# Patient Record
Sex: FEMALE | Race: BLACK OR AFRICAN AMERICAN | NOT HISPANIC OR LATINO | Employment: FULL TIME | URBAN - METROPOLITAN AREA
[De-identification: names, ages, dates, MRNs, and addresses within clinical notes are randomized per-mention and may not be internally consistent; named-entity substitution may affect disease eponyms.]

---

## 2018-01-22 LAB — CRC RECOMMENDATION EXT: NORMAL

## 2020-09-24 ENCOUNTER — HISTORICAL (OUTPATIENT)
Dept: ADMINISTRATIVE | Facility: HOSPITAL | Age: 55
End: 2020-09-24

## 2020-09-24 LAB
ABS NEUT (OLG): 4.59 X10(3)/MCL (ref 2.1–9.2)
ALBUMIN SERPL-MCNC: 3.4 GM/DL (ref 3.4–5)
ALBUMIN/GLOB SERPL: 0.9 RATIO (ref 1.1–2)
ALP SERPL-CCNC: 65 UNIT/L (ref 45–117)
ALT SERPL-CCNC: 16 UNIT/L (ref 12–78)
AST SERPL-CCNC: 16 UNIT/L (ref 15–37)
BASOPHILS # BLD AUTO: 0 X10(3)/MCL (ref 0–0.2)
BASOPHILS NFR BLD AUTO: 0 %
BILIRUB SERPL-MCNC: 0.3 MG/DL (ref 0.2–1)
BILIRUBIN DIRECT+TOT PNL SERPL-MCNC: <0.1 MG/DL (ref 0–0.2)
BILIRUBIN DIRECT+TOT PNL SERPL-MCNC: ABNORMAL MG/DL
BUN SERPL-MCNC: 14 MG/DL (ref 7–18)
CALCIUM SERPL-MCNC: 9 MG/DL (ref 8.5–10.1)
CHLORIDE SERPL-SCNC: 108 MMOL/L (ref 98–107)
CO2 SERPL-SCNC: 26 MMOL/L (ref 21–32)
CREAT SERPL-MCNC: 0.7 MG/DL (ref 0.6–1.3)
EOSINOPHIL # BLD AUTO: 0.2 X10(3)/MCL (ref 0–0.9)
EOSINOPHIL NFR BLD AUTO: 3 %
ERYTHROCYTE [DISTWIDTH] IN BLOOD BY AUTOMATED COUNT: 14.4 % (ref 11.5–14.5)
GLOBULIN SER-MCNC: 3.6 GM/ML (ref 2.3–3.5)
GLUCOSE SERPL-MCNC: 97 MG/DL (ref 74–106)
HCT VFR BLD AUTO: 36.9 % (ref 35–46)
HGB BLD-MCNC: 12.2 GM/DL (ref 12–16)
IMM GRANULOCYTES # BLD AUTO: 0.01 10*3/UL
IMM GRANULOCYTES NFR BLD AUTO: 0 %
LYMPHOCYTES # BLD AUTO: 0.9 X10(3)/MCL (ref 0.6–4.6)
LYMPHOCYTES NFR BLD AUTO: 14 %
MCH RBC QN AUTO: 29.9 PG (ref 26–34)
MCHC RBC AUTO-ENTMCNC: 33.1 GM/DL (ref 31–37)
MCV RBC AUTO: 90.4 FL (ref 80–100)
MONOCYTES # BLD AUTO: 0.4 X10(3)/MCL (ref 0.1–1.3)
MONOCYTES NFR BLD AUTO: 7 %
NEUTROPHILS # BLD AUTO: 4.59 X10(3)/MCL (ref 2.1–9.2)
NEUTROPHILS NFR BLD AUTO: 75 %
PLATELET # BLD AUTO: 281 X10(3)/MCL (ref 130–400)
PMV BLD AUTO: 10.5 FL (ref 7.4–10.4)
POTASSIUM SERPL-SCNC: 4.4 MMOL/L (ref 3.5–5.1)
PROT SERPL-MCNC: 7 GM/DL (ref 6.4–8.2)
RBC # BLD AUTO: 4.08 X10(6)/MCL (ref 4–5.2)
SODIUM SERPL-SCNC: 142 MMOL/L (ref 136–145)
TSH SERPL-ACNC: 1.35 MIU/L (ref 0.36–3.74)
WBC # SPEC AUTO: 6.1 X10(3)/MCL (ref 4.5–11)

## 2020-12-09 ENCOUNTER — HISTORICAL (OUTPATIENT)
Dept: OTOLARYNGOLOGY | Facility: CLINIC | Age: 55
End: 2020-12-09

## 2020-12-09 LAB
ABS NEUT (OLG): 2.35 X10(3)/MCL (ref 2.1–9.2)
ALBUMIN SERPL-MCNC: 3.4 GM/DL (ref 3.5–5)
ALBUMIN/GLOB SERPL: 1.2 RATIO (ref 1.1–2)
ALP SERPL-CCNC: 57 UNIT/L (ref 40–150)
ALT SERPL-CCNC: 9 UNIT/L (ref 0–55)
AST SERPL-CCNC: 14 UNIT/L (ref 5–34)
BASOPHILS # BLD AUTO: 0 X10(3)/MCL (ref 0–0.2)
BASOPHILS NFR BLD AUTO: 0 %
BILIRUB SERPL-MCNC: 0.6 MG/DL
BILIRUBIN DIRECT+TOT PNL SERPL-MCNC: 0.3 MG/DL (ref 0–0.5)
BILIRUBIN DIRECT+TOT PNL SERPL-MCNC: 0.3 MG/DL (ref 0–0.8)
BUN SERPL-MCNC: 11 MG/DL (ref 9.8–20.1)
CALCIUM SERPL-MCNC: 7.1 MG/DL (ref 8.4–10.2)
CHLORIDE SERPL-SCNC: 108 MMOL/L (ref 98–107)
CO2 SERPL-SCNC: 28 MMOL/L (ref 22–29)
CREAT SERPL-MCNC: 0.82 MG/DL (ref 0.55–1.02)
DEPRECATED CALCIDIOL+CALCIFEROL SERPL-MC: 33.5 NG/ML (ref 30–80)
EOSINOPHIL # BLD AUTO: 0.2 X10(3)/MCL (ref 0–0.9)
EOSINOPHIL NFR BLD AUTO: 4 %
ERYTHROCYTE [DISTWIDTH] IN BLOOD BY AUTOMATED COUNT: 13.2 % (ref 11.5–14.5)
GLOBULIN SER-MCNC: 2.8 GM/DL (ref 2.4–3.5)
GLUCOSE SERPL-MCNC: 98 MG/DL (ref 74–100)
HCT VFR BLD AUTO: 34.3 % (ref 35–46)
HGB BLD-MCNC: 11.1 GM/DL (ref 12–16)
IMM GRANULOCYTES # BLD AUTO: 0.01 10*3/UL
IMM GRANULOCYTES NFR BLD AUTO: 0 %
LYMPHOCYTES # BLD AUTO: 0.9 X10(3)/MCL (ref 0.6–4.6)
LYMPHOCYTES NFR BLD AUTO: 23 %
MCH RBC QN AUTO: 29.1 PG (ref 26–34)
MCHC RBC AUTO-ENTMCNC: 32.4 GM/DL (ref 31–37)
MCV RBC AUTO: 90 FL (ref 80–100)
MONOCYTES # BLD AUTO: 0.4 X10(3)/MCL (ref 0.1–1.3)
MONOCYTES NFR BLD AUTO: 10 %
NEUTROPHILS # BLD AUTO: 2.35 X10(3)/MCL (ref 2.1–9.2)
NEUTROPHILS NFR BLD AUTO: 62 %
PLATELET # BLD AUTO: 230 X10(3)/MCL (ref 130–400)
PMV BLD AUTO: 10.5 FL (ref 7.4–10.4)
POTASSIUM SERPL-SCNC: 4.1 MMOL/L (ref 3.5–5.1)
PROT SERPL-MCNC: 6.2 GM/DL (ref 6.4–8.3)
RBC # BLD AUTO: 3.81 X10(6)/MCL (ref 4–5.2)
SODIUM SERPL-SCNC: 141 MMOL/L (ref 136–145)
TSH SERPL-ACNC: 2.01 UIU/ML (ref 0.35–4.94)
WBC # SPEC AUTO: 3.8 X10(3)/MCL (ref 4.5–11)

## 2021-01-06 ENCOUNTER — HISTORICAL (OUTPATIENT)
Dept: RADIOLOGY | Facility: HOSPITAL | Age: 56
End: 2021-01-06

## 2021-04-19 ENCOUNTER — HISTORICAL (OUTPATIENT)
Dept: HEMATOLOGY/ONCOLOGY | Facility: CLINIC | Age: 56
End: 2021-04-19

## 2021-04-19 LAB
ABS NEUT (OLG): 1.98 X10(3)/MCL (ref 2.1–9.2)
ALBUMIN SERPL-MCNC: 3.5 GM/DL (ref 3.5–5)
ALBUMIN/GLOB SERPL: 1.1 RATIO (ref 1.1–2)
ALP SERPL-CCNC: 68 UNIT/L (ref 40–150)
ALT SERPL-CCNC: 13 UNIT/L (ref 0–55)
AST SERPL-CCNC: 17 UNIT/L (ref 5–34)
BASOPHILS # BLD AUTO: 0 X10(3)/MCL (ref 0–0.2)
BASOPHILS NFR BLD AUTO: 1 %
BILIRUB SERPL-MCNC: 0.7 MG/DL
BILIRUBIN DIRECT+TOT PNL SERPL-MCNC: 0.3 MG/DL (ref 0–0.5)
BILIRUBIN DIRECT+TOT PNL SERPL-MCNC: 0.4 MG/DL (ref 0–0.8)
BUN SERPL-MCNC: 11.9 MG/DL (ref 9.8–20.1)
CALCIUM SERPL-MCNC: 9.1 MG/DL (ref 8.4–10.2)
CHLORIDE SERPL-SCNC: 106 MMOL/L (ref 98–107)
CO2 SERPL-SCNC: 30 MMOL/L (ref 22–29)
CREAT SERPL-MCNC: 0.69 MG/DL (ref 0.55–1.02)
EOSINOPHIL # BLD AUTO: 0.2 X10(3)/MCL (ref 0–0.9)
EOSINOPHIL NFR BLD AUTO: 5 %
ERYTHROCYTE [DISTWIDTH] IN BLOOD BY AUTOMATED COUNT: 13.4 % (ref 11.5–14.5)
GLOBULIN SER-MCNC: 3.2 GM/DL (ref 2.4–3.5)
GLUCOSE SERPL-MCNC: 101 MG/DL (ref 74–100)
HCT VFR BLD AUTO: 36.3 % (ref 35–46)
HGB BLD-MCNC: 11.8 GM/DL (ref 12–16)
IMM GRANULOCYTES # BLD AUTO: 0.01 10*3/UL
IMM GRANULOCYTES NFR BLD AUTO: 0 %
LYMPHOCYTES # BLD AUTO: 1 X10(3)/MCL (ref 0.6–4.6)
LYMPHOCYTES NFR BLD AUTO: 28 %
MCH RBC QN AUTO: 29.2 PG (ref 26–34)
MCHC RBC AUTO-ENTMCNC: 32.5 GM/DL (ref 31–37)
MCV RBC AUTO: 89.9 FL (ref 80–100)
MONOCYTES # BLD AUTO: 0.3 X10(3)/MCL (ref 0.1–1.3)
MONOCYTES NFR BLD AUTO: 9 %
NEUTROPHILS # BLD AUTO: 1.98 X10(3)/MCL (ref 2.1–9.2)
NEUTROPHILS NFR BLD AUTO: 57 %
PLATELET # BLD AUTO: 235 X10(3)/MCL (ref 130–400)
PMV BLD AUTO: 10.1 FL (ref 7.4–10.4)
POTASSIUM SERPL-SCNC: 4.1 MMOL/L (ref 3.5–5.1)
PROT SERPL-MCNC: 6.7 GM/DL (ref 6.4–8.3)
RBC # BLD AUTO: 4.04 X10(6)/MCL (ref 4–5.2)
SODIUM SERPL-SCNC: 140 MMOL/L (ref 136–145)
TSH SERPL-ACNC: 2.27 UIU/ML (ref 0.35–4.94)
WBC # SPEC AUTO: 3.5 X10(3)/MCL (ref 4.5–11)

## 2021-10-19 ENCOUNTER — HISTORICAL (OUTPATIENT)
Dept: HEMATOLOGY/ONCOLOGY | Facility: CLINIC | Age: 56
End: 2021-10-19

## 2021-10-19 LAB
ABS NEUT (OLG): 3.42 X10(3)/MCL (ref 2.1–9.2)
ALBUMIN SERPL-MCNC: 3.8 GM/DL (ref 3.5–5)
ALBUMIN/GLOB SERPL: 1.2 RATIO (ref 1.1–2)
ALP SERPL-CCNC: 63 UNIT/L (ref 40–150)
ALT SERPL-CCNC: 11 UNIT/L (ref 0–55)
AST SERPL-CCNC: 17 UNIT/L (ref 5–34)
BASOPHILS # BLD AUTO: 0 X10(3)/MCL (ref 0–0.2)
BASOPHILS NFR BLD AUTO: 0 %
BILIRUB SERPL-MCNC: 0.7 MG/DL
BILIRUBIN DIRECT+TOT PNL SERPL-MCNC: 0.2 MG/DL (ref 0–0.5)
BILIRUBIN DIRECT+TOT PNL SERPL-MCNC: 0.5 MG/DL (ref 0–0.8)
BUN SERPL-MCNC: 7.7 MG/DL (ref 9.8–20.1)
CALCIUM SERPL-MCNC: 9.5 MG/DL (ref 8.4–10.2)
CHLORIDE SERPL-SCNC: 108 MMOL/L (ref 98–107)
CO2 SERPL-SCNC: 27 MMOL/L (ref 22–29)
CREAT SERPL-MCNC: 0.81 MG/DL (ref 0.55–1.02)
EOSINOPHIL # BLD AUTO: 0.2 X10(3)/MCL (ref 0–0.9)
EOSINOPHIL NFR BLD AUTO: 4 %
ERYTHROCYTE [DISTWIDTH] IN BLOOD BY AUTOMATED COUNT: 13.7 % (ref 11.5–14.5)
GLOBULIN SER-MCNC: 3.2 GM/DL (ref 2.4–3.5)
GLUCOSE SERPL-MCNC: 109 MG/DL (ref 74–100)
HCT VFR BLD AUTO: 37.9 % (ref 35–46)
HGB BLD-MCNC: 12.8 GM/DL (ref 12–16)
IMM GRANULOCYTES # BLD AUTO: 0.01 10*3/UL
IMM GRANULOCYTES NFR BLD AUTO: 0 %
LYMPHOCYTES # BLD AUTO: 0.9 X10(3)/MCL (ref 0.6–4.6)
LYMPHOCYTES NFR BLD AUTO: 18 %
MCH RBC QN AUTO: 30.4 PG (ref 26–34)
MCHC RBC AUTO-ENTMCNC: 33.8 GM/DL (ref 31–37)
MCV RBC AUTO: 90 FL (ref 80–100)
MONOCYTES # BLD AUTO: 0.4 X10(3)/MCL (ref 0.1–1.3)
MONOCYTES NFR BLD AUTO: 8 %
NEUTROPHILS # BLD AUTO: 3.42 X10(3)/MCL (ref 2.1–9.2)
NEUTROPHILS NFR BLD AUTO: 70 %
NRBC BLD AUTO-RTO: 0 % (ref 0–0.2)
PLATELET # BLD AUTO: 253 X10(3)/MCL (ref 130–400)
PMV BLD AUTO: 9.9 FL (ref 7.4–10.4)
POTASSIUM SERPL-SCNC: 4.1 MMOL/L (ref 3.5–5.1)
PROT SERPL-MCNC: 7 GM/DL (ref 6.4–8.3)
RBC # BLD AUTO: 4.21 X10(6)/MCL (ref 4–5.2)
SODIUM SERPL-SCNC: 143 MMOL/L (ref 136–145)
TSH SERPL-ACNC: 2.08 UIU/ML (ref 0.35–4.94)
WBC # SPEC AUTO: 4.9 X10(3)/MCL (ref 4.5–11)

## 2021-12-03 ENCOUNTER — HISTORICAL (OUTPATIENT)
Dept: RADIOLOGY | Facility: HOSPITAL | Age: 56
End: 2021-12-03

## 2021-12-03 LAB — CREAT SERPL-MCNC: 0.77 MG/DL (ref 0.55–1.02)

## 2022-04-11 ENCOUNTER — HISTORICAL (OUTPATIENT)
Dept: ADMINISTRATIVE | Facility: HOSPITAL | Age: 57
End: 2022-04-11
Payer: COMMERCIAL

## 2022-04-19 ENCOUNTER — HISTORICAL (OUTPATIENT)
Dept: ADMINISTRATIVE | Facility: HOSPITAL | Age: 57
End: 2022-04-19
Payer: COMMERCIAL

## 2022-04-19 LAB
ABS NEUT (OLG): 1.71 (ref 2.1–9.2)
ALBUMIN SERPL-MCNC: 3.4 G/DL (ref 3.5–5)
ALBUMIN/GLOB SERPL: 1.1 {RATIO} (ref 1.1–2)
ALP SERPL-CCNC: 55 U/L (ref 40–150)
ALT SERPL-CCNC: 6 U/L (ref 0–55)
AST SERPL-CCNC: 15 U/L (ref 5–34)
BASOPHILS # BLD AUTO: 0 10*3/UL (ref 0–0.2)
BASOPHILS NFR BLD AUTO: 1 %
BILIRUB SERPL-MCNC: 0.5 MG/DL
BILIRUBIN DIRECT+TOT PNL SERPL-MCNC: 0.2 (ref 0–0.5)
BILIRUBIN DIRECT+TOT PNL SERPL-MCNC: 0.3 (ref 0–0.8)
BUN SERPL-MCNC: 12.7 MG/DL (ref 9.8–20.1)
CALCIUM SERPL-MCNC: 9 MG/DL (ref 8.7–10.5)
CHLORIDE SERPL-SCNC: 108 MMOL/L (ref 98–107)
CO2 SERPL-SCNC: 29 MMOL/L (ref 22–29)
CREAT SERPL-MCNC: 0.71 MG/DL (ref 0.55–1.02)
EOSINOPHIL # BLD AUTO: 0.2 10*3/UL (ref 0–0.9)
EOSINOPHIL NFR BLD AUTO: 6 %
ERYTHROCYTE [DISTWIDTH] IN BLOOD BY AUTOMATED COUNT: 13.2 % (ref 11.5–14.5)
FLAG2 (OHS): 70
FLAG3 (OHS): 80
FLAGS (OHS): 80
GLOBULIN SER-MCNC: 3.1 G/DL (ref 2.4–3.5)
GLUCOSE SERPL-MCNC: 96 MG/DL (ref 74–100)
HCT VFR BLD AUTO: 36.5 % (ref 35–46)
HEMOLYSIS INTERF INDEX SERPL-ACNC: 5
HGB BLD-MCNC: 12 G/DL (ref 12–16)
ICTERIC INTERF INDEX SERPL-ACNC: 1
LIPEMIC INTERF INDEX SERPL-ACNC: 6
LOW EVENT # SUSPECT FLAG (OHS): 80
LYMPHOCYTES # BLD AUTO: 1.1 10*3/UL (ref 0.6–4.6)
LYMPHOCYTES NFR BLD AUTO: 32 %
MANUAL DIFF? (OHS): NO
MCH RBC QN AUTO: 29.6 PG (ref 26–34)
MCHC RBC AUTO-ENTMCNC: 32.9 G/DL (ref 31–37)
MCV RBC AUTO: 90.1 FL (ref 80–100)
MO BLASTS SUSPECT FLAG (OHS): 40
MONOCYTES # BLD AUTO: 0.3 10*3/UL (ref 0.1–1.3)
MONOCYTES NFR BLD AUTO: 9 %
NEUTROPHILS # BLD AUTO: 1.71 10*3/UL (ref 2.1–9.2)
NEUTROPHILS NFR BLD AUTO: 52 %
NRBC BLD AUTO-RTO: 0 % (ref 0–0.2)
PLATELET # BLD AUTO: 255 10*3/UL (ref 130–400)
PLATELET CLUMPS SUSPECT FLAG (OHS): 20
PMV BLD AUTO: 10.4 FL (ref 7.4–10.4)
POTASSIUM SERPL-SCNC: 4.2 MMOL/L (ref 3.5–5.1)
PROT SERPL-MCNC: 6.5 G/DL (ref 6.4–8.3)
RBC # BLD AUTO: 4.05 10*6/UL (ref 4–5.2)
SODIUM SERPL-SCNC: 140 MMOL/L (ref 136–145)
TSH SERPL-ACNC: 0.7 M[IU]/L (ref 0.35–4.94)
WBC # SPEC AUTO: 3.3 10*3/UL (ref 4.5–11)

## 2022-04-28 VITALS
HEIGHT: 66 IN | DIASTOLIC BLOOD PRESSURE: 79 MMHG | SYSTOLIC BLOOD PRESSURE: 129 MMHG | WEIGHT: 183 LBS | BODY MASS INDEX: 29.41 KG/M2 | OXYGEN SATURATION: 100 %

## 2022-07-26 ENCOUNTER — OFFICE VISIT (OUTPATIENT)
Dept: OTOLARYNGOLOGY | Facility: CLINIC | Age: 57
End: 2022-07-26
Payer: COMMERCIAL

## 2022-07-26 VITALS
HEART RATE: 76 BPM | TEMPERATURE: 98 F | DIASTOLIC BLOOD PRESSURE: 77 MMHG | BODY MASS INDEX: 31.62 KG/M2 | WEIGHT: 194.38 LBS | SYSTOLIC BLOOD PRESSURE: 115 MMHG

## 2022-07-26 DIAGNOSIS — K11.7 XEROSTOMIA: ICD-10-CM

## 2022-07-26 DIAGNOSIS — J31.0 RHINITIS, CHRONIC: ICD-10-CM

## 2022-07-26 DIAGNOSIS — Z85.818 HISTORY OF CANCER TONSIL: Primary | ICD-10-CM

## 2022-07-26 PROCEDURE — 3078F DIAST BP <80 MM HG: CPT | Mod: CPTII,,, | Performed by: NURSE PRACTITIONER

## 2022-07-26 PROCEDURE — 99213 OFFICE O/P EST LOW 20 MIN: CPT | Mod: PBBFAC,25 | Performed by: NURSE PRACTITIONER

## 2022-07-26 PROCEDURE — 31575 PR LARYNGOSCOPY, FLEXIBLE; DIAGNOSTIC: ICD-10-PCS | Mod: S$PBB,,, | Performed by: NURSE PRACTITIONER

## 2022-07-26 PROCEDURE — 99214 PR OFFICE/OUTPT VISIT, EST, LEVL IV, 30-39 MIN: ICD-10-PCS | Mod: 25,S$PBB,, | Performed by: NURSE PRACTITIONER

## 2022-07-26 PROCEDURE — 3008F BODY MASS INDEX DOCD: CPT | Mod: CPTII,,, | Performed by: NURSE PRACTITIONER

## 2022-07-26 PROCEDURE — 3074F PR MOST RECENT SYSTOLIC BLOOD PRESSURE < 130 MM HG: ICD-10-PCS | Mod: CPTII,,, | Performed by: NURSE PRACTITIONER

## 2022-07-26 PROCEDURE — 99214 OFFICE O/P EST MOD 30 MIN: CPT | Mod: 25,S$PBB,, | Performed by: NURSE PRACTITIONER

## 2022-07-26 PROCEDURE — 3074F SYST BP LT 130 MM HG: CPT | Mod: CPTII,,, | Performed by: NURSE PRACTITIONER

## 2022-07-26 PROCEDURE — 1159F MED LIST DOCD IN RCRD: CPT | Mod: CPTII,,, | Performed by: NURSE PRACTITIONER

## 2022-07-26 PROCEDURE — 31575 DIAGNOSTIC LARYNGOSCOPY: CPT | Mod: PBBFAC | Performed by: NURSE PRACTITIONER

## 2022-07-26 PROCEDURE — 1159F PR MEDICATION LIST DOCUMENTED IN MEDICAL RECORD: ICD-10-PCS | Mod: CPTII,,, | Performed by: NURSE PRACTITIONER

## 2022-07-26 PROCEDURE — 3078F PR MOST RECENT DIASTOLIC BLOOD PRESSURE < 80 MM HG: ICD-10-PCS | Mod: CPTII,,, | Performed by: NURSE PRACTITIONER

## 2022-07-26 PROCEDURE — 3008F PR BODY MASS INDEX (BMI) DOCUMENTED: ICD-10-PCS | Mod: CPTII,,, | Performed by: NURSE PRACTITIONER

## 2022-07-26 PROCEDURE — 31575 DIAGNOSTIC LARYNGOSCOPY: CPT | Mod: S$PBB,,, | Performed by: NURSE PRACTITIONER

## 2022-07-26 RX ORDER — FLUTICASONE PROPIONATE 50 MCG
SPRAY, SUSPENSION (ML) NASAL
COMMUNITY
Start: 2022-04-06 | End: 2022-07-26 | Stop reason: SDUPTHER

## 2022-07-26 RX ORDER — PILOCARPINE HYDROCHLORIDE 5 MG/1
5 TABLET, FILM COATED ORAL 3 TIMES DAILY
COMMUNITY
Start: 2022-04-06 | End: 2022-07-26 | Stop reason: SDUPTHER

## 2022-07-26 RX ORDER — PILOCARPINE HYDROCHLORIDE 5 MG/1
5 TABLET, FILM COATED ORAL 3 TIMES DAILY
Qty: 90 TABLET | Refills: 11 | Status: SHIPPED | OUTPATIENT
Start: 2022-07-26 | End: 2022-07-26

## 2022-07-26 RX ORDER — ZINC SULFATE 50(220)MG
220 CAPSULE ORAL
COMMUNITY

## 2022-07-26 RX ORDER — KETOCONAZOLE 20 MG/ML
SHAMPOO, SUSPENSION TOPICAL
COMMUNITY

## 2022-07-26 RX ORDER — TRAMADOL HYDROCHLORIDE 50 MG/1
TABLET ORAL
COMMUNITY

## 2022-07-26 RX ORDER — LIDOCAINE HYDROCHLORIDE 40 MG/ML
1 INJECTION, SOLUTION RETROBULBAR ONCE
Status: DISCONTINUED | OUTPATIENT
Start: 2022-07-26 | End: 2022-12-08

## 2022-07-26 RX ORDER — PILOCARPINE HYDROCHLORIDE 5 MG/1
5 TABLET, FILM COATED ORAL 3 TIMES DAILY
Qty: 90 TABLET | Refills: 11 | Status: SHIPPED | OUTPATIENT
Start: 2022-07-26 | End: 2022-12-08 | Stop reason: SDUPTHER

## 2022-07-26 RX ORDER — FLUTICASONE PROPIONATE 50 MCG
2 SPRAY, SUSPENSION (ML) NASAL 2 TIMES DAILY
Qty: 16 G | Refills: 11 | Status: SHIPPED | OUTPATIENT
Start: 2022-07-26 | End: 2022-07-26

## 2022-07-26 RX ORDER — FLUTICASONE PROPIONATE 50 MCG
2 SPRAY, SUSPENSION (ML) NASAL 2 TIMES DAILY
Qty: 16 G | Refills: 11 | Status: SHIPPED | OUTPATIENT
Start: 2022-07-26 | End: 2022-08-11 | Stop reason: SDUPTHER

## 2022-07-26 RX ORDER — CLOTRIMAZOLE AND BETAMETHASONE DIPROPIONATE 10; .64 MG/G; MG/G
CREAM TOPICAL
COMMUNITY

## 2022-07-26 NOTE — PROGRESS NOTES
"[facility]  Otolaryngology Clinic Note    Jil Duque  Encounter Date: 7/26/2022  YOB: 1965    Chief Complaint: cancer surveillance    HPI: 12/8/20: Is a 55-year-old female who was seen by me in 2019 and private practice, a biopsy of the right tonsil was done and returned squamous cell carcinoma, HPV positive. She elects to go to [facility] where she received radiation therapy, had immunotherapy with proton beam radiation, which she completed in November 2019. She had post treatment scans including a PET scan and all were normal by her history. No notes from [facility] are available at this time, I reviewed the notes in the chart She was staged as a T3 N1 M0, She currently is experiencing some dysphagia, but this is gradually improving. She has lost a total of 90 pounds but is slowly gaining back. Her last CT scan was 8 months ago. She had been seen Dr. Oneal Bowden in [place], the notes in the chart reflect he did a fiberoptic endoscopy and was normal 3 months ago. She has since lost her job because of COVID and is now on Medicaid and was referred here for follow-up.    1/26/21: Returns to clinic today. No complaints or issues. No otalgia, weight loss, new dysphagia or odynophagia, changes in voice or breathing. Has a hard time eating meat due to longstanding dysphagia to it, but still able to get it down - feels it sometimes "gets stuck" but this is not new. Recent CT scans demonstrated no evidence of disease.    4/20/21: Doing well. Denies dysphagia, dysphonia, pain, wt loss, LAD. C/o recent nasal congestion and PND. Reports using flonase in the past which was helpful. Occasional nasal dryness. 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 Patient has no complaints of any sore throat, hemoptysis, or swelling in the neck. She does have periodic difficulty swallowing foods such " "as meats but otherwise has no dysphagia. She does admit to having problems with dry mouth since undergoing her treatment. She has been using Biotene but despite this she still has problems with dry mouth. This is worse at night.  The patient's oral intake does continue to improve. She had indicated that she does continue to gain some weight. She had had problems with altered sense of taste since her treatments but this is also improving though it is not back to its "normal" condition.  She does have some problems with chronic rhinitis and is currently using fluticasone nasal spray but does so on an every other day basis. She finds that it does help some. She has noticed that sometimes congestion may be worse at night and she is not sure if this may be related to her dryness of the mouth being worse at night. It also been given samples of a saline irrigation kit and she did find that that was also helpful with her nasal symptoms but she had not bought any refills.    9/21/21: Doing well overall. States she is having more uppercase PET scan\been eating more for pleasure and gaining weight. Denied any fatigue weakness myalgia. 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 States that she was doing better with her mucus and congestion when she was doing the dryness. Dr. Mcarthur gave her a last visit but has been out. Otherwise no other complaints    12/3/21: 12/8/20: Is a 55-year-old female who was seen by me in 2019 and private practice, a biopsy of the right tonsil was done and returned squamous cell carcinoma, HPV positive. She elects to go to [facility] where she received radiation therapy, had immunotherapy with proton beam radiation, which she completed in November 2019. She had post treatment scans including a PET scan and all were normal by her history. No notes from [facility] are available at this time, I reviewed the notes in the chart She was staged " "as a T3 N1 M0, She currently is experiencing some dysphagia, but this is gradually improving. She has lost a total of 90 pounds but is slowly gaining back. Her last CT scan was 8 months ago. She had been seen Dr. Oneal Bowden in [place], the notes in the chart reflect he did a fiberoptic endoscopy and was normal 3 months ago. She has since lost her job because of COVID and is now on Medicaid and was referred here for follow-up.    1/26/21: Returns to clinic today. No complaints or issues. No otalgia, weight loss, new dysphagia or odynophagia, changes in voice or breathing. Has a hard time eating meat due to longstanding dysphagia to it, but still able to get it down - feels it sometimes "gets stuck" but this is not new. Recent CT scans demonstrated no evidence of disease.    4/20/21: Doing well. Denies dysphagia, dysphonia, pain, wt loss, LAD. C/o recent nasal congestion and PND. Reports using flonase in the past which was helpful. Occasional nasal dryness. 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 Patient has no complaints of any sore throat, hemoptysis, or swelling in the neck. She does have periodic difficulty swallowing foods such as meats but otherwise has no dysphagia. She does admit to having problems with dry mouth since undergoing her treatment. She has been using Biotene but despite this she still has problems with dry mouth. This is worse at night.  The patient's oral intake does continue to improve. She had indicated that she does continue to gain some weight. She had had problems with altered sense of taste since her treatments but this is also improving though it is not back to its "normal" condition.  She does have some problems with chronic rhinitis and is currently using fluticasone nasal spray but does so on an every other day basis. She finds that it does help some. She has noticed that sometimes congestion " may be worse at night and she is not sure if this may be related to her dryness of the mouth being worse at night. It also been given samples of a saline irrigation kit and she did find that that was also helpful with her nasal symptoms but she had not bought any refills.    9/21/21: Doing well overall. States she is having more uppercase PET scan\been eating more for pleasure and gaining weight. Denied any fatigue weakness myalgia. 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 States that she was doing better with her mucus and congestion when she was doing the dryness. Dr. Mcarthur gave her a last visit but has been out. Otherwise no other complaints    12/3/21: Doing well overall. Appetite good, maintaining weight. Denied fatigue weakness myalgia. Still has some neuropathy States that she was doing better with her mucus and congestion when she was doing the dryness. The sialogen is helping for the xerostomia edition her taste is slowly coming back. Otherwise no other complaints. Last TSH was done in October and was normal. [1]    4/6/2022: Patient presenting in surveillance. At this time she notes she has some mucus pooling within her oropharynx and some minor levels of dysphagia however, she is continuing to maintain weight at this time. In fact she is gaining weight at this time. She denies any fatigue, weakness, myalgia, new onset pain in her neck or mouth. She does not have any lymphadenopathy that she has noticed. Additionally patient notes this year she has had increased rhinitis. She has not noticed this prior or is not specific to any seasons. She is doing nasal irrigations that is helping her. She notes her left nostril feels a little tender.    07/26/2022: Doing well per report. She denies any dysphagia apart from baseline to certain things following tx. Denies odynophagia, otalgia, LAD. She has been gaining weight. Salagen helps & she used it prn. She has  been using saline nasal rinses with help rhinitis/PND but has not tried flonase following NSI.    ROS:   10-point review of systems negative except per HPI      Review of patient's allergies indicates:  No Known Allergies    Past Medical History:   Diagnosis Date    Cancer July 2019    Radiation Oct 2019       Past Surgical History:   Procedure Laterality Date    HYSTERECTOMY      left foream      right heel         Social History     Socioeconomic History    Marital status: Single   Tobacco Use    Smoking status: Never Smoker    Smokeless tobacco: Never Used   Substance and Sexual Activity    Alcohol use: Never    Drug use: Never    Sexual activity: Not Currently     Birth control/protection: Abstinence       Family History   Problem Relation Age of Onset    Diabetes Maternal Grandmother     Hypertension Maternal Grandmother     Osteoarthritis Maternal Grandmother        Outpatient Encounter Medications as of 7/26/2022   Medication Sig Dispense Refill    clotrimazole-betamethasone 1-0.05% (LOTRISONE) cream clotrimazole-betamethasone 1 %-0.05 % topical cream      fluticasone propionate (FLONASE) 50 mcg/actuation nasal spray by Nasal route.      ketoconazole (NIZORAL) 2 % shampoo ketoconazole 2 % shampoo   SHAMPOO TWICE WEEKLY THEN RINSE      pilocarpine (SALAGEN) 5 MG Tab Take 5 mg by mouth 3 (three) times daily.      traMADoL (ULTRAM) 50 mg tablet tramadol 50 mg tablet      zinc sulfate (ZINCATE) 50 mg zinc (220 mg) capsule Take 220 mg by mouth.       No facility-administered encounter medications on file as of 7/26/2022.       Physical Exam:  Vitals:    07/26/22 1504   BP: 115/77   Pulse: 76   Temp: 98.1 °F (36.7 °C)   Weight: 88.2 kg (194 lb 6.4 oz)     General: Well-developed well-nourished female in no acute distress. Voice is normal. No hoarseness.  Head and face: Normocephalic. No facial lesions. No temporomandibular joint tenderness or click.  Ears: Auricles are developed normally  bilaterally. Both external auditory canals are clear. No evidence of otitis externa. No cerumen impaction. Tympanic membranes are nonerythematous. No middle ear effusions.  Nose: Nasal dorsum is unremarkable. No significant septal deviation. No intranasal congestion. Secretions are clear. No abnormal drainage.  Oral cavity and oropharynx: MMM. Tongue and floor mouth are soft to palpation without masses or lesions. Oropharynx is without lesions. No apparent recurrent disease in the right tonsillar fossa. Nothing palpable. Thick PND  Neck: Supple without palpable adenopathy or thyromegaly. Trachea is in the midline. Parotid and submandibular glands are normal to palpation.  Eyes: Extraocular muscles are intact bilaterally. No exophthalmos or enophthalmos.  Neurologic: Alert and oriented x3. Cranial nerves II through XII are grossly normal.    FLEXIBLE FIBEROPTIC LARYNGOSCOPY with Dr. Real  Verbal consent was obtained from patient. Phenylephrine and tetracaine were applied to bilateral nasal mucosa for decongestion and local anesthesia. Flexible laryngoscope was passed into the left nasal cavity fully examining the nasal cavity. The scope was then passed posterior to the nasal choana, nasopharynx, oropharynx, hypopharynx. Supraglottic and glottic landmarks were fully visualized.    Findings include:  NC/ NP: wnl, no masses or lesions, no drainage  Soft palate/ OP: wnl  BOT/ [place]/ epiglottis: wnl ; both surfaces of epiglottis are crisp  Supraglottis: arytenoids, AE folds, false VFs, piriform sinuses, pharyngeal walls all wnl, no lesions.  TVC: fully mobile bilaterally, no edema, lesions, polyps, masses  Subglottics: airway is patent  No signs of malignancy. The patient tolerated the procedure well without any complications.    (12/03/2021 14:14 CST CT Soft Tissue Neck W Contrast)    IMPRESSION:  Stable exam without significant interval change compared to 1/6/2021       Assessment/Plan:  56 y.o. female T3 N0  M0 p16+ SCC of the right tonsil s/p XRT and immunotherapy completed 11/2019 at [facility]- no evident disease. CT 12/2021 wnl.  Radiation-induced xerostomia. Chronic rhinitis.    Plan:  Continue Salagen 5 mg p.o.3 times daily.  Continue NSI  Continue fluticasone nasal spray  Follow-up in 4 months Res template     Fatoumata Bishop, NP

## 2022-07-26 NOTE — PROGRESS NOTES
The scope used for the exam was:  Flexible scope ENF-P4  Serial Number:  1)    [id number]    []   2)    [id number]    [x]   3)    [id number]    []   4)    [id number]    []   5)    [id number]    []   6)    [id number]    []       The scope used for the exam was:  Rigid scope   Serial Number:  1)   [id number]    []   2)   [id number]    []   3)   [id number]    []   4)    3384   []   5)    0824   []   6)    5554   []     7)   7425    []   8)   2240    []   9)   1109    []

## 2022-08-11 RX ORDER — PILOCARPINE HYDROCHLORIDE 5 MG/1
5 TABLET, FILM COATED ORAL 3 TIMES DAILY
Qty: 90 TABLET | Refills: 11 | Status: SHIPPED | OUTPATIENT
Start: 2022-08-11 | End: 2022-12-08

## 2022-08-11 RX ORDER — FLUTICASONE PROPIONATE 50 MCG
2 SPRAY, SUSPENSION (ML) NASAL 2 TIMES DAILY
Qty: 16 G | Refills: 11 | Status: SHIPPED | OUTPATIENT
Start: 2022-08-11 | End: 2022-09-10

## 2022-11-17 ENCOUNTER — DOCUMENTATION ONLY (OUTPATIENT)
Dept: HEMATOLOGY/ONCOLOGY | Facility: CLINIC | Age: 57
End: 2022-11-17
Payer: COMMERCIAL

## 2022-11-17 NOTE — PROGRESS NOTES
Yes please send orders over for CBC,CMP to facility of patient choice to be completed early December   Ashley please schedule patient for TM appointment with NP early December

## 2022-11-17 NOTE — NURSING
Received voice message from Patient requesting to her her lab work orders sent to [place]. Patient is on surveillance with last office visit was in April 2022 with labs due in 6 months. Lab work was scheduled for 10/19/22 which was a no show and virtual appointment on 10/27/22 patient was also a no show.     Please advise on how you wish to proceed.    JORDANA HayesN, RN  Oncology Nurse Navigator

## 2022-12-06 DIAGNOSIS — Z85.818 HISTORY OF CANCER TONSIL: Primary | ICD-10-CM

## 2022-12-08 ENCOUNTER — OFFICE VISIT (OUTPATIENT)
Dept: HEMATOLOGY/ONCOLOGY | Facility: CLINIC | Age: 57
End: 2022-12-08
Payer: COMMERCIAL

## 2022-12-08 ENCOUNTER — OFFICE VISIT (OUTPATIENT)
Dept: OTOLARYNGOLOGY | Facility: CLINIC | Age: 57
End: 2022-12-08
Payer: COMMERCIAL

## 2022-12-08 VITALS
RESPIRATION RATE: 20 BRPM | HEART RATE: 64 BPM | HEIGHT: 66 IN | DIASTOLIC BLOOD PRESSURE: 89 MMHG | SYSTOLIC BLOOD PRESSURE: 172 MMHG | OXYGEN SATURATION: 100 % | TEMPERATURE: 98 F | WEIGHT: 190 LBS | BODY MASS INDEX: 30.53 KG/M2

## 2022-12-08 VITALS
WEIGHT: 190.63 LBS | DIASTOLIC BLOOD PRESSURE: 100 MMHG | HEART RATE: 56 BPM | BODY MASS INDEX: 31 KG/M2 | SYSTOLIC BLOOD PRESSURE: 170 MMHG | TEMPERATURE: 98 F

## 2022-12-08 DIAGNOSIS — Z08 ENCOUNTER FOR FOLLOW-UP SURVEILLANCE OF TONSILLAR CANCER: ICD-10-CM

## 2022-12-08 DIAGNOSIS — Z85.818 ENCOUNTER FOR FOLLOW-UP SURVEILLANCE OF TONSILLAR CANCER: ICD-10-CM

## 2022-12-08 DIAGNOSIS — G62.9 NEUROPATHY: ICD-10-CM

## 2022-12-08 DIAGNOSIS — K11.7 XEROSTOMIA DUE TO RADIOTHERAPY: ICD-10-CM
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
## 2022-12-08 DIAGNOSIS — Z85.818 HISTORY OF CANCER TONSIL: Primary | ICD-10-CM

## 2022-12-08 DIAGNOSIS — C09.9 SQUAMOUS CELL CARCINOMA OF RIGHT TONSIL: Primary | ICD-10-CM

## 2022-12-08 DIAGNOSIS — Z76.89 ENCOUNTER TO ESTABLISH CARE: ICD-10-CM

## 2022-12-08 DIAGNOSIS — J31.0 CHRONIC RHINITIS: ICD-10-CM

## 2022-12-08 PROCEDURE — 1159F MED LIST DOCD IN RCRD: CPT | Mod: CPTII,,,

## 2022-12-08 PROCEDURE — 4010F PR ACE/ARB THEARPY RXD/TAKEN: ICD-10-PCS | Mod: CPTII,,,

## 2022-12-08 PROCEDURE — 3077F PR MOST RECENT SYSTOLIC BLOOD PRESSURE >= 140 MM HG: ICD-10-PCS | Mod: CPTII,,,

## 2022-12-08 PROCEDURE — 3079F DIAST BP 80-89 MM HG: CPT | Mod: CPTII,,,

## 2022-12-08 PROCEDURE — 3008F BODY MASS INDEX DOCD: CPT | Mod: CPTII,,,

## 2022-12-08 PROCEDURE — 99214 PR OFFICE/OUTPT VISIT, EST, LEVL IV, 30-39 MIN: ICD-10-PCS | Mod: S$PBB,,,

## 2022-12-08 PROCEDURE — 99214 OFFICE O/P EST MOD 30 MIN: CPT | Mod: PBBFAC | Performed by: OTOLARYNGOLOGY

## 2022-12-08 PROCEDURE — 3008F PR BODY MASS INDEX (BMI) DOCUMENTED: ICD-10-PCS | Mod: CPTII,,,

## 2022-12-08 PROCEDURE — 99214 OFFICE O/P EST MOD 30 MIN: CPT | Mod: PBBFAC,27

## 2022-12-08 PROCEDURE — 1159F PR MEDICATION LIST DOCUMENTED IN MEDICAL RECORD: ICD-10-PCS | Mod: CPTII,,,

## 2022-12-08 PROCEDURE — 31575 DIAGNOSTIC LARYNGOSCOPY: CPT | Mod: PBBFAC | Performed by: OTOLARYNGOLOGY

## 2022-12-08 PROCEDURE — 1160F PR REVIEW ALL MEDS BY PRESCRIBER/CLIN PHARMACIST DOCUMENTED: ICD-10-PCS | Mod: CPTII,,,

## 2022-12-08 PROCEDURE — 99214 OFFICE O/P EST MOD 30 MIN: CPT | Mod: S$PBB,,,

## 2022-12-08 PROCEDURE — 3077F SYST BP >= 140 MM HG: CPT | Mod: CPTII,,,

## 2022-12-08 PROCEDURE — 4010F ACE/ARB THERAPY RXD/TAKEN: CPT | Mod: CPTII,,,

## 2022-12-08 PROCEDURE — 1160F RVW MEDS BY RX/DR IN RCRD: CPT | Mod: CPTII,,,

## 2022-12-08 PROCEDURE — 3079F PR MOST RECENT DIASTOLIC BLOOD PRESSURE 80-89 MM HG: ICD-10-PCS | Mod: CPTII,,,

## 2022-12-08 RX ORDER — PILOCARPINE HYDROCHLORIDE 5 MG/1
5 TABLET, FILM COATED ORAL 3 TIMES DAILY
Qty: 90 TABLET | Refills: 11 | Status: SHIPPED | OUTPATIENT
Start: 2022-12-08 | End: 2023-11-21 | Stop reason: CLARIF

## 2022-12-08 RX ORDER — GABAPENTIN 300 MG/1
300 CAPSULE ORAL NIGHTLY
Qty: 30 CAPSULE | Refills: 2 | Status: SHIPPED | OUTPATIENT
Start: 2022-12-08 | End: 2023-04-03

## 2022-12-08 RX ORDER — FLUTICASONE PROPIONATE 50 MCG
2 SPRAY, SUSPENSION (ML) NASAL DAILY
Qty: 15.8 ML | Refills: 11 | Status: SHIPPED | OUTPATIENT
Start: 2022-12-08

## 2022-12-08 RX ORDER — LIDOCAINE HYDROCHLORIDE 40 MG/ML
1 INJECTION, SOLUTION RETROBULBAR
Status: DISPENSED | OUTPATIENT
Start: 2022-12-08

## 2022-12-08 NOTE — PROGRESS NOTES
"Patient Name: Jil Duque   YOB: 1965     Chief Complaint:   Chief Complaint   Patient presents with    Follow-up     Ca surveillance          History of Present Illness:  12/8/20: Is a 55-year-old female who was seen by me in 2019 and private practice, a biopsy of the right tonsil was done and returned squamous cell carcinoma, HPV positive. She elects to go to [facility] where she received radiation therapy, had immunotherapy with proton beam radiation, which she completed in November 2019. She had post treatment scans including a PET scan and all were normal by her history. No notes from [facility] are available at this time, I reviewed the notes in the chart She was staged as a T3 N1 M0, She currently is experiencing some dysphagia, but this is gradually improving. She has lost a total of 90 pounds but is slowly gaining back. Her last CT scan was 8 months ago. She had been seen Dr. Oneal Bowden in [place], the notes in the chart reflect he did a fiberoptic endoscopy and was normal 3 months ago. She has since lost her job because of COVID and is now on Medicaid and was referred here for follow-up.    1/26/21: Returns to clinic today. No complaints or issues. No otalgia, weight loss, new dysphagia or odynophagia, changes in voice or breathing. Has a hard time eating meat due to longstanding dysphagia to it, but still able to get it down - feels it sometimes "gets stuck" but this is not new. Recent CT scans demonstrated no evidence of disease.    4/20/21: Doing well. Denies dysphagia, dysphonia, pain, wt loss, LAD. C/o recent nasal congestion and PND. Reports using flonase in the past which was helpful. Occasional nasal dryness. 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 Patient has no complaints of any sore throat, hemoptysis, or swelling in the neck. She does have periodic difficulty swallowing " "foods such as meats but otherwise has no dysphagia. She does admit to having problems with dry mouth since undergoing her treatment. She has been using Biotene but despite this she still has problems with dry mouth. This is worse at night.  The patient's oral intake does continue to improve. She had indicated that she does continue to gain some weight. She had had problems with altered sense of taste since her treatments but this is also improving though it is not back to its "normal" condition.  She does have some problems with chronic rhinitis and is currently using fluticasone nasal spray but does so on an every other day basis. She finds that it does help some. She has noticed that sometimes congestion may be worse at night and she is not sure if this may be related to her dryness of the mouth being worse at night. It also been given samples of a saline irrigation kit and she did find that that was also helpful with her nasal symptoms but she had not bought any refills.    9/21/21: Doing well overall. States she is having more uppercase PET scan\been eating more for pleasure and gaining weight. Denied any fatigue weakness myalgia. 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 States that she was doing better with her mucus and congestion when she was doing the dryness. Dr. Mcarthur gave her a last visit but has been out. Otherwise no other complaints    12/3/21: 12/8/20: Is a 55-year-old female who was seen by me in 2019 and private practice, a biopsy of the right tonsil was done and returned squamous cell carcinoma, HPV positive. She elects to go to [facility] where she received radiation therapy, had immunotherapy with proton beam radiation, which she completed in November 2019. She had post treatment scans including a PET scan and all were normal by her history. No notes from [facility] are available at this time, I reviewed the notes in the chart She " "was staged as a T3 N1 M0, She currently is experiencing some dysphagia, but this is gradually improving. She has lost a total of 90 pounds but is slowly gaining back. Her last CT scan was 8 months ago. She had been seen Dr. Oneal Bowden in [place], the notes in the chart reflect he did a fiberoptic endoscopy and was normal 3 months ago. She has since lost her job because of COVID and is now on Medicaid and was referred here for follow-up.    1/26/21: Returns to clinic today. No complaints or issues. No otalgia, weight loss, new dysphagia or odynophagia, changes in voice or breathing. Has a hard time eating meat due to longstanding dysphagia to it, but still able to get it down - feels it sometimes "gets stuck" but this is not new. Recent CT scans demonstrated no evidence of disease.    4/20/21: Doing well. Denies dysphagia, dysphonia, pain, wt loss, LAD. C/o recent nasal congestion and PND. Reports using flonase in the past which was helpful. Occasional nasal dryness. 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 Patient has no complaints of any sore throat, hemoptysis, or swelling in the neck. She does have periodic difficulty swallowing foods such as meats but otherwise has no dysphagia. She does admit to having problems with dry mouth since undergoing her treatment. She has been using Biotene but despite this she still has problems with dry mouth. This is worse at night.  The patient's oral intake does continue to improve. She had indicated that she does continue to gain some weight. She had had problems with altered sense of taste since her treatments but this is also improving though it is not back to its "normal" condition.  She does have some problems with chronic rhinitis and is currently using fluticasone nasal spray but does so on an every other day basis. She finds that it does help some. She has noticed that sometimes " congestion may be worse at night and she is not sure if this may be related to her dryness of the mouth being worse at night. It also been given samples of a saline irrigation kit and she did find that that was also helpful with her nasal symptoms but she had not bought any refills.    9/21/21: Doing well overall. States she is having more uppercase PET scan\been eating more for pleasure and gaining weight. Denied any fatigue weakness myalgia. 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 States that she was doing better with her mucus and congestion when she was doing the dryness. Dr. Mcarthur gave her a last visit but has been out. Otherwise no other complaints    12/3/21: Doing well overall. Appetite good, maintaining weight. Denied fatigue weakness myalgia. Still has some neuropathy States that she was doing better with her mucus and congestion when she was doing the dryness. The sialogen is helping for the xerostomia edition her taste is slowly coming back. Otherwise no other complaints. Last TSH was done in October and was normal. [1]    4/6/2022: Patient presenting in surveillance. At this time she notes she has some mucus pooling within her oropharynx and some minor levels of dysphagia however, she is continuing to maintain weight at this time. In fact she is gaining weight at this time. She denies any fatigue, weakness, myalgia, new onset pain in her neck or mouth. She does not have any lymphadenopathy that she has noticed. Additionally patient notes this year she has had increased rhinitis. She has not noticed this prior or is not specific to any seasons. She is doing nasal irrigations that is helping her. She notes her left nostril feels a little tender.     07/26/2022: Doing well per report. She denies any dysphagia apart from baseline to certain things following tx. Denies odynophagia, otalgia, LAD. She has been gaining weight. Salagen helps & she used it  prn. She has been using saline nasal rinses with help rhinitis/PND but has not tried flonase following NSI.    December 8, 2022:   Patient presents today for follow-up of her squamous cell carcinoma of the tonsil.  She is doing well at this time.  She has no complaints of sore throat, difficulty swallowing, swelling in the neck, or changes in weight.  She does continue to use Salagen for her dry mouth and fluticasone nasal spray which does seem to help with rhinitis and nasal congestion.  She was seen by Dr. Galvez earlier today.  She had lab work done which included a CBC which was unremarkable as well as metabolic profile which was within normal limits except for a chloride of 109.  TSH level was not done.  No new problems today.    Past Medical History:  Past Medical History:   Diagnosis Date    Cancer July 2019    Radiation Oct 2019     Past Surgical History:   Procedure Laterality Date    EYE SURGERY  2001    HYSTERECTOMY      left foream      right heel         Review of Systems:  Unremarkable except as mentioned above.    Current Medications:  Current Outpatient Medications   Medication Sig    clotrimazole-betamethasone 1-0.05% (LOTRISONE) cream clotrimazole-betamethasone 1 %-0.05 % topical cream    ketoconazole (NIZORAL) 2 % shampoo ketoconazole 2 % shampoo   SHAMPOO TWICE WEEKLY THEN RINSE    pilocarpine (SALAGEN) 5 MG Tab Take 1 tablet (5 mg total) by mouth 3 (three) times daily.    pilocarpine (SALAGEN) 5 MG Tab Take 1 tablet (5 mg total) by mouth 3 (three) times daily.    traMADoL (ULTRAM) 50 mg tablet tramadol 50 mg tablet    zinc sulfate (ZINCATE) 50 mg zinc (220 mg) capsule Take 220 mg by mouth.     Current Facility-Administered Medications   Medication    LIDOcaine (PF) 40 mg/mL (4 %) injection 1 mL    phenylephrine HCL 1% nasal spray 1 spray        Allergies:  Review of patient's allergies indicates:  No Known Allergies     Physical Exam:  Vital signs:   Vitals:    12/08/22 1254 12/08/22 1255  12/08/22 1258   BP: (!) 175/91 (!) 169/94 (!) 170/100  Comment: checked bp manually, pt states she did walk from [address] to [address], and denies any nause and dizziness. pt does take bp medicine   Pulse: (!) 56     Temp: 97.8 °F (36.6 °C)     Weight: 86.5 kg (190 lb 9.6 oz)     General:  Well-developed well-nourished female in no acute distress.  Voice is normal.  Head and face:  Normocephalic.  No facial lesions.  No temporomandibular joint tenderness or click.  Ears:  Right ear-auricle is normally developed.  External auditory canal is clear.  Tympanic membrane is nonerythematous.  No middle ear effusion.  Left ear-auricle is normally developed.  External auditory canal is clear.  Tympanic membrane is nonerythematous.  No middle ear effusion.  Nose:  Nasal dorsum is unremarkable.  No significant septal deviation.  No significant intranasal congestion.  Secretions are clear.  Oral cavity and oropharynx:  Tongue and floor mouth are without lesions.  Mucosa is moist.  No pharyngeal erythema or exudates.  No oropharyngeal masses.  No tonsillar hypertrophy.  No masses in the right tonsillar bed or the oropharynx.    Neck:  Supple without adenopathy or thyromegaly.  Trachea is in the midline.  Parotid and submandibular glands are normal to palpation without masses or tenderness.  Eyes:  Extraocular muscles intact.  No nystagmus.  No exophthalmos or enophthalmos.  Neurologic:  Alert and oriented.  Cranial nerves 2-12 are grossly normal.    Procedure note: Flexible laryngoscopy.  The nose was prepped with 1% phenyleprine nasal spray and tetracaine topically. The flexible fiberoptic laryngoscope was introduced into the right nostril and advanced. Examination of the nose showed the above mentioned findings. Examination of the nasopharynx showed nasopharyngeal masses or eustachian tube obstruction. Examination of the base of tongue showed no masses or lingual tonsil hypertrophy. Laryngeal examination showed normal vocal  cord mobility bilaterally.  No laryngeal masses. Examination of the hypopharynx showed no masses or pooling of secretions in the piriform sinuses.      Assessment/Plan:  57-year-old female with T2 N1 M0 P 16 positive squamous cell carcinoma of the right tonsil treated with radiation therapy and immunotherapy at [facility] which was completed in November of 2019.  No evident disease at this time.    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    Chronic rhinitis.      Plan:  Scheduled CT scan of the neck with contrast.    TSH level.    Continue Salagen 5 mg p.o. t.i.d. and fluticasone nasal spray 2 puffs each nostril q.d..    Follow-up in 4 months.      HEAD & NECK CANCER SURVEILLANCE   Radiation and/or Chemotherapy     Medical Oncologist: Ryan (Last seen: 12/8/2022)  Radiation Oncologist: MD Broussard (Last seen: ?)    Primary tumor: T2N1M0, p16+ squamous cell carcinoma of right tonsil    Date of Diagnosis: 7/10/2019  Induction Chemotherapy:  Yes; with ipilumamab  &nivolumab  Therapy:  See below  Completion Date: See below    Pertinent Treatment History:   Diagnosed with T2N1M0 p16+ squamous cell carcinoma of right tonsil on 7/10/2019; treated at [facility] with XRT with induction chemotherapy (ipilumamab & nivolumab) 10/15/2019-11/27/2019 & neoadjuvant therapy (ipilumamab & nivolumab) 9/13/2019-5/26/2020.    Place date if completed   3 6 12 18 24 36 48 60   CT Neck X X X 1/6/21 12/3/21 X X X   PET/CT X          CXR  X X 1/6/21 (CT) X X X X   TFT X  X  4/19/22 X X X     Current Surveillance Interval:  4 months          Jack Real M.D.

## 2022-12-09 NOTE — PROGRESS NOTES
History of Present Illness Past medical history: NIDDM (she lost 90 lbs in the course of chemoradiation therapy for tonsillar cancer and has been off metformin since March 2020). Partial hysterectomy in 2007 (abnormal cells in uterus; no malignancy). Bladder lift (at the same time as partial hysterectomy in 2007). Essential hypertension. Dyslipidemia. Never smoked.  Social history: Single. Lives in [place], [place]. Has 4 children. Used to work as a manager at [employer]; laid off due to COVID pandemic. Never smoked. No history of illicit drug abuse. No history of alcohol abuse. History of oral sexual activity.   Family history: Negative for malignancy.  Health maintenance:  -Screening mammogram July 2020 at breast clinic, [place], [place], apparently unremarkable  -Colonoscopy 2 years back (2018) at [facility] (family history of colon polyps), unremarkable  Menstrual and OB/GYN history: Status post partial hysterectomy in 2007 for abnormal uterine cells.      Reason for follow-up:  -History of squamous cell carcinoma of right tonsil, p16 positive      History of present illness:  57-year-old female referred by Dr. Nina Munoz with squamous cell carcinoma of left tonsil.    She saw an ENT in [place] in 2019 for evaluation of chronic recurrent sore throat and foreign body sensation in throat which had lasted for 1 year and not responded to antibiotic therapy. Apparently, biopsy showed cancer of tonsil.  Her ENT physician referred her to [facility] for treatment.    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 in July 2019.  Stage I or stage II.  Enrolled in phase 2 study involving induction chemotherapy with ipilimumab + nivolumab (IMVAX trial for HPV positive squamous cell carcinoma of tonsil)  Began concurrent chemotherapy and proton radiation to bilateral neck on 10/14/2019,  completing 11/24/2019 (or 10/15/2019-11/27/2019, not clear)  Records are very brief and confusing at the time of dictation.  According to her, radiation therapy lasted from 10/15/2019-11/27/2019.  According to her, she received immunotherapy every 2 weeks x 6 cycles, over a period of 12 weeks (she does not remember exact dates but we have requested more records from [facility]).  90 lbs unintentional weight loss through 05/26/2020; came off metformin in March 2020 (history of NIDDM)  -No disease recurrence (CT soft of the neck with contrast: 05/26/2020)    Was followed by Dr. Joanna Lawson, oncologist, at [facility]    Now referred to [facility] oncology for ongoing care/surveillance after losing medical insurance, unable to follow-up with [facility] anymore.    She has established ongoing ENT care with Dr. David Bowden in [place].  She says that she visited with him in early September; apparently, FFL exam was negative for tumor recurrence.  According to her, Dr. Bowden wanted to order a neck CT as well as PET CT scan.  However, Ms. Duque feels well, without any new, concerning, or progressive symptoms of concern.      Sequence of events as per available records ([facility]):    07/10/2019: Biopsy right tonsil:  -At least superficially invasive squamous cell carcinoma, moderately differentiated  HPV RNA ADAM panel:  -HPV high risk cocktail: Detected  -HPV high risk 16/18: Not detected  -HPV low risk cocktail: Not detected    cT2 cN1 cM0, p16+    Treatment plan:   [id number] ipilimumab and nivolumab in combination with radiation therapy  -Nivolumab 370 mg IV x2 cycles  -Ipilimumab 123 mg IV x2 cycles  -Treatment dates: 09/03/2019-05/26/2020  -Line of treatment: Neoadjuvant therapy  -Treatment goal: Life prolongation  -Radiation therapy: 10/15/2019-11/27/2019 05/26/2020: Physician evaluation (Dr. Joanna Yates):  Ageusia; early satiety; gradual weight loss continuing;  has lost 90 lbs but remains overweight with BMI 27.4; off metformin; mild xerostomia; persistent oral sensitivity; intermittent ear congestion; feeling very well generally. No adenopathy.    History of HPV positive T3 N1 M0 squamous cell carcinoma of right tonsil. Enrolled on phase 2 study involving induction chemotherapy with ipilimumab and nivolumab. She began concurrent immunotherapy and proton radiation to bilateral neck on 10/14/2019, completing 11/24/2019 (IMVAX trial for HPV positive squamous cell carcinoma of tonsil)    05/26/2020: Physician evaluation: MARTHA per neck CT    05/26/2020: CT soft tissue of the neck with contrast (comparison: PET/CT: 02/24/2020; prior posttreatment CT: 01/21/2020):  Evolving postradiation changes; no evidence of disease recurrence    05/26/2020: Modified barium swallow (post radiation 3-6-month MBS):  Significant xerostomia that is impacting oral intake.  Continues to lose weight.  Eats raw carrots and celery. Oral liquids. Dentition full/near full dentate. No dentures. No current feeding tube. Intelligible speech. No tracheostomy. Difficulty swallowing solids. No dysarthria.  Mild pharyngeal dysphagia with a greater impact to the efficiency of swallow. Reduced base of tongue retraction resulting in mild residue at the level of the vallecula, cleared with multiple effortful swallows. 1 instance of silent penetration. Etiology of dysphagia is consistent with history of radiation.  Recommendation: Regular diet with thin liquids; maintain swallowing exercises; effortful swallow before use of a liquid wash; MBS 18-12 months post XRT.      Interval history:  09/24/2020:  Presents for initial oncology consultation.  Overall, feels very well. Says that she is feeling full of energy. Has never felt this good before.  Still, ageusia.  No dysphagia, odynophagia, pain in throat, otalgia, speech problems, unusual headaches, focal neurological symptoms, etc.  Has no taste buds. Therefore, when  she tries to eat spicy foods, it irritates her throat.  She experiences some problems swallowing meat due to xerostomia, therefore, has to eat slow.  Constipated for last 3 or 4 months. Has a bowel movement once every 2-3 days. Been severely constipated, notices some blood with stool and on toilet before.  To recall, colonoscopy 2 years back (in 2018) at [facility], was apparently unremarkable.  No history of tobacco, alcohol, or illicit drug abuse. Denies cough or shortness of breath.  Abdominal pain/discomfort only when she is constipated.    04/20/2021:  -01/06/2021: CT soft tissue of the neck with contrast: No cervical lymphadenopathy  -01/06/2021: CT chest with contrast: No intrathoracic metastasis  -01/26/2021: ENT follow-up: Flexible laryngoscopy: MARTHA; follow-up in 2 months  -04/19/2021: CBC and CMP reviewed; CO2 30; hemoglobin 11.8, stable; WBC 3.5; ANC 1.98; TSH 2.2742, normal     12/8/22:  Patient here today unaccompanied for follow up of squamous cell carcinoma of right tonsil. She also follows ENT and is scheduled to see them today as well. She is relatively stable in how she is feeling.  She reports neuropathy to bilateral hands have worsened over the last few months.  She states she was taking Vitamin B6 but it is no longer working for her.  Advised can restart Gabapentin for her to take in the evening as needed for her neuropathy and she is amenable.  She denies sore throat, difficulty swallowing, swelling in the neck, or changes in weight. She is past due with her scans. Advised, ENT will order new scans for follow up. She is amenable.  Labs reviewed; clinically stable. She denies any other issues or concerns at this time        Review of systems:  All systems reviewed, and found to be negative except for the symptoms detailed above.      Physical examination:    Vitals:    12/08/22 1047   BP: (!) 172/89   Pulse: 64   Resp: 20   Temp: 97.8 °F (36.6 °C)       GENERAL: In no apparent  distress.   HEAD: No signs of head trauma.  EYES: Pupils are equal. Extraocular motions intact.   EARS: Hearing grossly intact.  MOUTH: Oropharynx is normal.  NECK: No adenopathy, no JVD.   CHEST: Chest with clear breath sounds bilaterally. No wheezes, rales, or rhonchi.   CARDIAC: Regular rate and rhythm. S1 and S2, without murmurs, gallops, or rubs.  VASCULAR: No Edema. Peripheral pulses normal and equal in all extremities.  ABDOMEN: Soft, without detectable tenderness. No sign of distention. No rebound or guarding, and no masses palpated. Bowel Sounds normal.  MUSCULOSKELETAL: Good range of motion of all major joints. Extremities without clubbing, cyanosis or edema.   NEUROLOGIC EXAM: Alert and oriented x 3. No focal sensory or strength deficits. Speech normal. Follows commands.  PSYCHIATRIC: Mood normal.  SKIN: No rash or lesions.  09/24/2020: In no acute discomfort. Appears healthy. No palpable lymphadenopathy in cervical or supraclavicular areas.  04/20/2021: Not examined; it was a telemedicine visit.      Assessment:  #Squamous cell carcinoma of right tonsil, p16 positive  (Diagnosed and treated at [facility] [place])  -Right tonsil biopsy (07/10/2019): Squamous cell carcinoma; HPV high risk cocktail detected  -cT2 cN1 M0 (stage I) or cT3 cN1 M0 (stage II)  -Treated with ipilimumab + nivolumab in combination with (concurrent) RT (on protocol) (phase 2, IMVAX trial for HPV positive squamous cell carcinoma of tonsil)  -Nivolumab 370 mg IV x2 cycles  -Ipilimumab 123 mg IV x2 cycles  -Treatment dates: 09/03/2019-05/26/2020   -Line of treatment: Neoadjuvant therapy  -Radiation therapy: 10/15/2019-11/27/2019  -No recurrence on PET/CT (02/04/2020)  -No disease recurrence (CT neck: 05/26/2020)  -As of 05/26/2020: Ageusia; early satiety; 90 lbs weight loss; off metformin (previous history of NIDDM; off Metformin after weight loss); persistent oral sensitivity; no adenopathy; generally, doing very  well  -01/06/2021: CT soft tissue of the neck with contrast: No cervical lymphadenopathy  -01/06/2021: CT chest with contrast: No intrathoracic metastasis  -01/26/2021: ENT follow-up: Flexible laryngoscopy: MARTHA; follow-up in 2 months      Plan:  -MARTHA as of 01/2021 (CTs; ENT examination)      -Maintain close follow-up with ENT for surveillance  -Follow up with Gynecology for establishment and recommended screening  -Hypertension; asymptomatic; follow up with PCP for management  -Gabapentin 300mg po qhs prn neuropathy to bilateral hands  -Scheduled CT scan of the neck with contrast 1/5/23  -Follow-up visit with us in 6 months, with CBC, CMP, and TSH level; earlier, if any concerns in the interim    Above discussed with her. All questions answered.  She understands and agrees this plan.  ------------------      Recommended surveillance per NCCN guidelines:  1. History and physical examination including a complete head and neck exam; mirror and fiberoptic examination as clinically indicated every 1-3 months in year 1; every 2-6 months in the year 2; every 4-8 months in years 3-5; every 12 months beyond 5 years;    2. If FDG PET/CT at 3 months posttreatment is negative, there are no data to support substantial benefit for further routine imaging in an asymptomatic patient with negative exam.  Additional posttreatment imaging is indicated for worrisome or equivocal signs/symptoms.  Routine annual imaging (repeat use of pretreatment imaging modality) may be indicated to visualize areas inaccessible to routine clinical examination (deep-seated anatomic locations for areas obscured by extensive treatment change).    3. TSH every 6-12 months if neck irradiated;    4. Dental evaluation recommended for oral cavity and sites exposed to significant intraoral radiation treatment    5. Supportive care and rehabilitation: Speech/hearing and swallow evaluation and rehabilitation as clinically indicated; nutritional evaluation and  rehabilitation as clinically indicated until nutritional status is stabilized; ongoing surveillance for depression; smoking cessation and

## 2023-05-09 ENCOUNTER — PATIENT MESSAGE (OUTPATIENT)
Dept: RESEARCH | Facility: HOSPITAL | Age: 58
End: 2023-05-09
Payer: COMMERCIAL

## 2023-06-14 ENCOUNTER — LAB VISIT (OUTPATIENT)
Dept: LAB | Facility: HOSPITAL | Age: 58
End: 2023-06-14
Attending: OTOLARYNGOLOGY
Payer: COMMERCIAL

## 2023-06-14 DIAGNOSIS — C09.9 SQUAMOUS CELL CARCINOMA OF RIGHT TONSIL: ICD-10-CM

## 2023-06-14 DIAGNOSIS — Z85.818 HISTORY OF CANCER TONSIL: ICD-10-CM

## 2023-06-14 LAB
ALBUMIN SERPL-MCNC: 3.8 G/DL (ref 3.5–5)
ALBUMIN/GLOB SERPL: 1.2 RATIO (ref 1.1–2)
ALP SERPL-CCNC: 66 UNIT/L (ref 40–150)
ALT SERPL-CCNC: 10 UNIT/L (ref 0–55)
AST SERPL-CCNC: 16 UNIT/L (ref 5–34)
BASOPHILS # BLD AUTO: 0.02 X10(3)/MCL
BASOPHILS NFR BLD AUTO: 0.5 %
BILIRUBIN DIRECT+TOT PNL SERPL-MCNC: 0.7 MG/DL
BUN SERPL-MCNC: 12.3 MG/DL (ref 9.8–20.1)
CALCIUM SERPL-MCNC: 9.1 MG/DL (ref 8.4–10.2)
CHLORIDE SERPL-SCNC: 107 MMOL/L (ref 98–107)
CO2 SERPL-SCNC: 26 MMOL/L (ref 22–29)
CREAT SERPL-MCNC: 0.78 MG/DL (ref 0.55–1.02)
EOSINOPHIL # BLD AUTO: 0.21 X10(3)/MCL (ref 0–0.9)
EOSINOPHIL NFR BLD AUTO: 5 %
ERYTHROCYTE [DISTWIDTH] IN BLOOD BY AUTOMATED COUNT: 13.4 % (ref 11.5–17)
GFR SERPLBLD CREATININE-BSD FMLA CKD-EPI: >60 MLS/MIN/1.73/M2
GLOBULIN SER-MCNC: 3.1 GM/DL (ref 2.4–3.5)
GLUCOSE SERPL-MCNC: 92 MG/DL (ref 74–100)
HCT VFR BLD AUTO: 37.5 % (ref 37–47)
HGB BLD-MCNC: 12.4 G/DL (ref 12–16)
IMM GRANULOCYTES # BLD AUTO: 0.01 X10(3)/MCL (ref 0–0.04)
IMM GRANULOCYTES NFR BLD AUTO: 0.2 %
LYMPHOCYTES # BLD AUTO: 1.12 X10(3)/MCL (ref 0.6–4.6)
LYMPHOCYTES NFR BLD AUTO: 26.9 %
MCH RBC QN AUTO: 29.3 PG (ref 27–31)
MCHC RBC AUTO-ENTMCNC: 33.1 G/DL (ref 33–36)
MCV RBC AUTO: 88.7 FL (ref 80–94)
MONOCYTES # BLD AUTO: 0.34 X10(3)/MCL (ref 0.1–1.3)
MONOCYTES NFR BLD AUTO: 8.2 %
NEUTROPHILS # BLD AUTO: 2.46 X10(3)/MCL (ref 2.1–9.2)
NEUTROPHILS NFR BLD AUTO: 59.2 %
NRBC BLD AUTO-RTO: 0 %
PLATELET # BLD AUTO: 257 X10(3)/MCL (ref 130–400)
PMV BLD AUTO: 10.1 FL (ref 7.4–10.4)
POTASSIUM SERPL-SCNC: 4.2 MMOL/L (ref 3.5–5.1)
PROT SERPL-MCNC: 6.9 GM/DL (ref 6.4–8.3)
RBC # BLD AUTO: 4.23 X10(6)/MCL (ref 4.2–5.4)
SODIUM SERPL-SCNC: 140 MMOL/L (ref 136–145)
TSH SERPL-ACNC: 1.07 UIU/ML (ref 0.35–4.94)
WBC # SPEC AUTO: 4.16 X10(3)/MCL (ref 4.5–11.5)

## 2023-06-14 PROCEDURE — 80053 COMPREHEN METABOLIC PANEL: CPT

## 2023-06-14 PROCEDURE — 84443 ASSAY THYROID STIM HORMONE: CPT

## 2023-06-14 PROCEDURE — 85025 COMPLETE CBC W/AUTO DIFF WBC: CPT

## 2023-06-14 PROCEDURE — 36415 COLL VENOUS BLD VENIPUNCTURE: CPT

## 2023-06-15 DIAGNOSIS — C09.9 SQUAMOUS CELL CARCINOMA OF RIGHT TONSIL: Primary | ICD-10-CM

## 2023-06-16 NOTE — PROGRESS NOTES
"Patient Name: Jil Duque   YOB: 1965     Chief Complaint:   Chief Complaint   Patient presents with    Follow-up     Ca surveillance          History of Present Illness:  12/8/20: Is a 55-year-old female who was seen by me in 2019 and private practice, a biopsy of the right tonsil was done and returned squamous cell carcinoma, HPV positive. She elects to go to [facility] where she received radiation therapy, had immunotherapy with proton beam radiation, which she completed in November 2019. She had post treatment scans including a PET scan and all were normal by her history. No notes from [facility] are available at this time, I reviewed the notes in the chart She was staged as a T3 N1 M0, She currently is experiencing some dysphagia, but this is gradually improving. She has lost a total of 90 pounds but is slowly gaining back. Her last CT scan was 8 months ago. She had been seen Dr. Oneal Bowden in [place], the notes in the chart reflect he did a fiberoptic endoscopy and was normal 3 months ago. She has since lost her job because of COVID and is now on Medicaid and was referred here for follow-up.    1/26/21: Returns to clinic today. No complaints or issues. No otalgia, weight loss, new dysphagia or odynophagia, changes in voice or breathing. Has a hard time eating meat due to longstanding dysphagia to it, but still able to get it down - feels it sometimes "gets stuck" but this is not new. Recent CT scans demonstrated no evidence of disease.    4/20/21: Doing well. Denies dysphagia, dysphonia, pain, wt loss, LAD. C/o recent nasal congestion and PND. Reports using flonase in the past which was helpful. Occasional nasal dryness. 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 Patient has no complaints of any sore throat, hemoptysis, or swelling in the neck. She does have periodic difficulty swallowing " "foods such as meats but otherwise has no dysphagia. She does admit to having problems with dry mouth since undergoing her treatment. She has been using Biotene but despite this she still has problems with dry mouth. This is worse at night.  The patient's oral intake does continue to improve. She had indicated that she does continue to gain some weight. She had had problems with altered sense of taste since her treatments but this is also improving though it is not back to its "normal" condition.  She does have some problems with chronic rhinitis and is currently using fluticasone nasal spray but does so on an every other day basis. She finds that it does help some. She has noticed that sometimes congestion may be worse at night and she is not sure if this may be related to her dryness of the mouth being worse at night. It also been given samples of a saline irrigation kit and she did find that that was also helpful with her nasal symptoms but she had not bought any refills.    9/21/21: Doing well overall. States she is having more uppercase PET scan\been eating more for pleasure and gaining weight. Denied any fatigue weakness myalgia. 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 States that she was doing better with her mucus and congestion when she was doing the dryness. Dr. Mcarthur gave her a last visit but has been out. Otherwise no other complaints    12/3/21: 12/8/20: Is a 55-year-old female who was seen by me in 2019 and private practice, a biopsy of the right tonsil was done and returned squamous cell carcinoma, HPV positive. She elects to go to [facility] where she received radiation therapy, had immunotherapy with proton beam radiation, which she completed in November 2019. She had post treatment scans including a PET scan and all were normal by her history. No notes from [facility] are available at this time, I reviewed the notes in the chart She " "was staged as a T3 N1 M0, She currently is experiencing some dysphagia, but this is gradually improving. She has lost a total of 90 pounds but is slowly gaining back. Her last CT scan was 8 months ago. She had been seen Dr. Oneal Bowden in [place], the notes in the chart reflect he did a fiberoptic endoscopy and was normal 3 months ago. She has since lost her job because of COVID and is now on Medicaid and was referred here for follow-up.    1/26/21: Returns to clinic today. No complaints or issues. No otalgia, weight loss, new dysphagia or odynophagia, changes in voice or breathing. Has a hard time eating meat due to longstanding dysphagia to it, but still able to get it down - feels it sometimes "gets stuck" but this is not new. Recent CT scans demonstrated no evidence of disease.    4/20/21: Doing well. Denies dysphagia, dysphonia, pain, wt loss, LAD. C/o recent nasal congestion and PND. Reports using flonase in the past which was helpful. Occasional nasal dryness. 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 Patient has no complaints of any sore throat, hemoptysis, or swelling in the neck. She does have periodic difficulty swallowing foods such as meats but otherwise has no dysphagia. She does admit to having problems with dry mouth since undergoing her treatment. She has been using Biotene but despite this she still has problems with dry mouth. This is worse at night.  The patient's oral intake does continue to improve. She had indicated that she does continue to gain some weight. She had had problems with altered sense of taste since her treatments but this is also improving though it is not back to its "normal" condition.  She does have some problems with chronic rhinitis and is currently using fluticasone nasal spray but does so on an every other day basis. She finds that it does help some. She has noticed that sometimes " congestion may be worse at night and she is not sure if this may be related to her dryness of the mouth being worse at night. It also been given samples of a saline irrigation kit and she did find that that was also helpful with her nasal symptoms but she had not bought any refills.    9/21/21: Doing well overall. States she is having more uppercase PET scan\been eating more for pleasure and gaining weight. Denied any fatigue weakness myalgia. 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 States that she was doing better with her mucus and congestion when she was doing the dryness. Dr. Mcarthur gave her a last visit but has been out. Otherwise no other complaints    12/3/21: Doing well overall. Appetite good, maintaining weight. Denied fatigue weakness myalgia. Still has some neuropathy States that she was doing better with her mucus and congestion when she was doing the dryness. The sialogen is helping for the xerostomia edition her taste is slowly coming back. Otherwise no other complaints. Last TSH was done in October and was normal. [1]    4/6/2022: Patient presenting in surveillance. At this time she notes she has some mucus pooling within her oropharynx and some minor levels of dysphagia however, she is continuing to maintain weight at this time. In fact she is gaining weight at this time. She denies any fatigue, weakness, myalgia, new onset pain in her neck or mouth. She does not have any lymphadenopathy that she has noticed. Additionally patient notes this year she has had increased rhinitis. She has not noticed this prior or is not specific to any seasons. She is doing nasal irrigations that is helping her. She notes her left nostril feels a little tender.     07/26/2022: Doing well per report. She denies any dysphagia apart from baseline to certain things following tx. Denies odynophagia, otalgia, LAD. She has been gaining weight. Salagen helps & she used it  prn. She has been using saline nasal rinses with help rhinitis/PND but has not tried flonase following [facility].    December 8, 2022: Patient presents today for follow-up of her squamous cell carcinoma of the tonsil.  She is doing well at this time.  She has no complaints of sore throat, difficulty swallowing, swelling in the neck, or changes in weight.  She does continue to use Salagen for her dry mouth and fluticasone nasal spray which does seem to help with rhinitis and nasal congestion.  She was seen by Dr. Galvez earlier today.  She had lab work done which included a CBC which was unremarkable as well as metabolic profile which was within normal limits except for a chloride of 109.  TSH level was not done. No new problems today.    6/19/23: Doing well. Denies any dysphagia, dysphonia, or type B symptoms. States tiki had been working well for xerostomia but her insurance stopped covering it.     Past Medical History:  Past Medical History:   Diagnosis Date    Cancer July 2019    Radiation Oct 2019     Past Surgical History:   Procedure Laterality Date    EYE SURGERY  2001    HYSTERECTOMY      left foream      right heel         Review of Systems:  Unremarkable except as mentioned above.    Current Medications:  Current Outpatient Medications   Medication Sig    clotrimazole-betamethasone 1-0.05% (LOTRISONE) cream clotrimazole-betamethasone 1 %-0.05 % topical cream    ketoconazole (NIZORAL) 2 % shampoo ketoconazole 2 % shampoo   SHAMPOO TWICE WEEKLY THEN RINSE    pilocarpine (SALAGEN) 5 MG Tab Take 1 tablet (5 mg total) by mouth 3 (three) times daily.    pilocarpine (SALAGEN) 5 MG Tab Take 1 tablet (5 mg total) by mouth 3 (three) times daily.    traMADoL (ULTRAM) 50 mg tablet tramadol 50 mg tablet    zinc sulfate (ZINCATE) 50 mg zinc (220 mg) capsule Take 220 mg by mouth.     Current Facility-Administered Medications   Medication    LIDOcaine (PF) 40 mg/mL (4 %) injection 1 mL    phenylephrine HCL 1% nasal spray 1  spray        Allergies:  Review of patient's allergies indicates:  No Known Allergies     Physical Exam:  Vital signs:   Vitals:    12/08/22 1254 12/08/22 1255 12/08/22 1258   BP: (!) 175/91 (!) 169/94 (!) 170/100  Comment: checked bp manually, pt states she did walk from [place] to [address], and denies any nause and dizziness. pt does take bp medicine   Pulse: (!) 56     Temp: 97.8 °F (36.6 °C)     Weight: 86.5 kg (190 lb 9.6 oz)       General:  Well-developed well-nourished female in no acute distress.  Voice is normal.  Head and face:  Normocephalic.  No facial lesions.  No temporomandibular joint tenderness or click.  Ears:  Right ear-auricle is normally developed.  External auditory canal is clear.  Tympanic membrane is nonerythematous.  No middle ear effusion.  Left ear-auricle is normally developed.  External auditory canal is clear.  Tympanic membrane is nonerythematous.  No middle ear effusion.  Nose:  Nasal dorsum is unremarkable.  No significant septal deviation.  No significant intranasal congestion.  Secretions are clear.  Oral cavity and oropharynx:  Tongue and floor mouth are without lesions.  Mucosa is moist.  No pharyngeal erythema or exudates.  No oropharyngeal masses.  No tonsillar hypertrophy.  No masses in the right tonsillar bed or the oropharynx.    Neck:  Supple without adenopathy or thyromegaly.  Trachea is in the midline.  Parotid and submandibular glands are normal to palpation without masses or tenderness.  Eyes:  Extraocular muscles intact.  No nystagmus.  No exophthalmos or enophthalmos.  Neurologic:  Alert and oriented.  Cranial nerves 2-12 are grossly normal.    Procedure note: Flexible laryngoscopy with Dr. Horne:   The nose was prepped with 1% phenyleprine nasal spray and tetracaine topically. The flexible fiberoptic laryngoscope was introduced into the right nostril and advanced. Examination of the nose showed the above mentioned findings. Examination of the nasopharynx  showed no nasopharyngeal masses or eustachian tube obstruction. Examination of the base of tongue showed no masses or lingual tonsil hypertrophy. Laryngeal examination showed normal vocal cord mobility bilaterally.  No laryngeal masses. Examination of the hypopharynx showed no masses or pooling of secretions in the piriform sinuses.    Labs:       Imaging:  Narrative & Impression  EXAMINATION:  CT SOFT TISSUE NECK WITH CONTRAST     CLINICAL HISTORY:  57-year-old woman for follow-up of right tonsillar squamous cell carcinoma diagnosed in 2019, post radiation therapy and immunotherapy.     TECHNIQUE:  Thin slice helical CT imaging was performed from above the skull base to the lung apices through the neck following the administration of 100 mL Omnipaque 300 low osmolar intravenous contrast and with coronal and sagittal reformatted images generated from the axial data set per routine protocol.  Automatic dose modulation and/or weight based mA/kv utilized to achieve as low as reasonable radiation dose.     COMPARISON:  Neck CT with contrast 12/03/2021.     FINDINGS:  There is no pathology identified at the visualized caudal brain or skull base.  There is no neck mass or cervical lymphadenopathy.  There is no acute pathology at the pharynx or parapharyngeal soft tissues.  The larynx is normal.  The parotid and submandibular glands are symmetric and demonstrate no pathology aside from atrophy at the submandibular glands.  No prevertebral or paravertebral soft tissue abnormality.  There is no significant abnormality along the carotid spaces.  The thyroid gland is normal and there is no abnormality at the thoracic inlet are supraclavicular fossa a. no acute pathology or significant abnormality at the visualized upper chest.  Heart size is normal.  No acute skeletal abnormality or osseous lesion.  There is advanced degenerative disc disease at C6-C7.  There is mild mucosal thickening and thin mucous retention cyst formation  at the bilateral maxillary sinuses.  Additional small mucous retention cyst at the right sphenoid sinus.  No paranasal sinus fluid to suggest acute sinusitis.     Impression:     No acute pathology or metastatic disease identified at the neck or upper chest.  No significant change compared to a CT 12/03/2021.        Electronically signed by: Truman Cruz  Date:                                            01/16/2023  Time:                                           12:27    Assessment/Plan:  57-year-old female with T2 N1 M0 P 16 positive squamous cell carcinoma of the right tonsil treated with radiation therapy and immunotherapy at [facility] which was completed in November of 2019.  No evident disease at this time.  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 TSH WNL. CT neck with MARTHA- reviewed with Dr. Horne & pt. CXR today- unremarkable.     Plan:  - Evoxac TID for xerostomia  - [place] fluid intake  - fluticasone nasal spray 2 puffs each nostril q.d..    - Follow-up in 4 months on Res template      HEAD & NECK CANCER SURVEILLANCE   Radiation and/or Chemotherapy     Medical Oncologist: Ryan (Last seen: 12/8/2022)  Radiation Oncologist: MD Broussard (Last seen: ?)    Primary tumor: T2N1M0, p16+ squamous cell carcinoma of right tonsil    Date of Diagnosis: 7/10/2019  Induction Chemotherapy: Yes; with ipilumamab &nivolumab  Therapy: See below  Completion Date: See below    Pertinent Treatment History:   Diagnosed with T2N1M0 p16+ squamous cell carcinoma of right tonsil on 7/10/2019; treated at [facility] with XRT with induction chemotherapy (ipilumamab & nivolumab) 10/15/2019-11/27/2019 & neoadjuvant therapy (ipilumamab & nivolumab) 9/13/2019-5/26/2020.    Place date if completed   3 6 12 18 24 36 48 60   CT Neck X X X 1/6/21 12/3/21 X 1/16/23 X   PET/CT X          CXR  X X 1/6/21 (CT) X X 6/19/23 X   TFT X  X  4/19/22 X 6/14/23 X     Current Surveillance Interval: 4  months          Fatoumata Bishop, NP

## 2023-06-19 ENCOUNTER — OFFICE VISIT (OUTPATIENT)
Dept: OTOLARYNGOLOGY | Facility: CLINIC | Age: 58
End: 2023-06-19
Payer: COMMERCIAL

## 2023-06-19 ENCOUNTER — APPOINTMENT (OUTPATIENT)
Dept: HEMATOLOGY/ONCOLOGY | Facility: CLINIC | Age: 58
End: 2023-06-19
Payer: COMMERCIAL

## 2023-06-19 ENCOUNTER — HOSPITAL ENCOUNTER (OUTPATIENT)
Dept: RADIOLOGY | Facility: HOSPITAL | Age: 58
Discharge: HOME | End: 2023-06-19
Attending: NURSE PRACTITIONER
Payer: COMMERCIAL

## 2023-06-19 VITALS
SYSTOLIC BLOOD PRESSURE: 145 MMHG | WEIGHT: 196 LBS | RESPIRATION RATE: 16 BRPM | TEMPERATURE: 98 F | HEART RATE: 59 BPM | DIASTOLIC BLOOD PRESSURE: 85 MMHG | HEIGHT: 65 IN | BODY MASS INDEX: 32.65 KG/M2
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
## 2023-06-19 DIAGNOSIS — C09.9 SQUAMOUS CELL CARCINOMA OF RIGHT TONSIL: Primary | ICD-10-CM

## 2023-06-19 DIAGNOSIS — C09.9 SQUAMOUS CELL CARCINOMA OF RIGHT TONSIL: ICD-10-CM

## 2023-06-19 DIAGNOSIS — J31.0 CHRONIC RHINITIS: ICD-10-CM

## 2023-06-19 DIAGNOSIS — K11.7 XEROSTOMIA DUE TO RADIOTHERAPY: ICD-10-CM

## 2023-06-19 PROCEDURE — 4010F ACE/ARB THERAPY RXD/TAKEN: CPT | Mod: CPTII,,, | Performed by: NURSE PRACTITIONER

## 2023-06-19 PROCEDURE — 99214 PR OFFICE/OUTPT VISIT, EST, LEVL IV, 30-39 MIN: ICD-10-PCS | Mod: 25,S$PBB,, | Performed by: NURSE PRACTITIONER

## 2023-06-19 PROCEDURE — 3077F SYST BP >= 140 MM HG: CPT | Mod: CPTII,,, | Performed by: NURSE PRACTITIONER

## 2023-06-19 PROCEDURE — 31575 DIAGNOSTIC LARYNGOSCOPY: CPT | Mod: S$PBB,,, | Performed by: NURSE PRACTITIONER

## 2023-06-19 PROCEDURE — 31575 PR LARYNGOSCOPY, FLEXIBLE; DIAGNOSTIC: ICD-10-PCS | Mod: S$PBB,,, | Performed by: NURSE PRACTITIONER

## 2023-06-19 PROCEDURE — 71046 X-RAY EXAM CHEST 2 VIEWS: CPT | Mod: TC

## 2023-06-19 PROCEDURE — 3008F PR BODY MASS INDEX (BMI) DOCUMENTED: ICD-10-PCS | Mod: CPTII,,, | Performed by: NURSE PRACTITIONER

## 2023-06-19 PROCEDURE — 99214 OFFICE O/P EST MOD 30 MIN: CPT | Mod: PBBFAC,25 | Performed by: NURSE PRACTITIONER

## 2023-06-19 PROCEDURE — 3077F PR MOST RECENT SYSTOLIC BLOOD PRESSURE >= 140 MM HG: ICD-10-PCS | Mod: CPTII,,, | Performed by: NURSE PRACTITIONER

## 2023-06-19 PROCEDURE — 99214 OFFICE O/P EST MOD 30 MIN: CPT | Mod: 25,S$PBB,, | Performed by: NURSE PRACTITIONER

## 2023-06-19 PROCEDURE — 1159F MED LIST DOCD IN RCRD: CPT | Mod: CPTII,,, | Performed by: NURSE PRACTITIONER

## 2023-06-19 PROCEDURE — 3008F BODY MASS INDEX DOCD: CPT | Mod: CPTII,,, | Performed by: NURSE PRACTITIONER

## 2023-06-19 PROCEDURE — 4010F PR ACE/ARB THEARPY RXD/TAKEN: ICD-10-PCS | Mod: CPTII,,, | Performed by: NURSE PRACTITIONER

## 2023-06-19 PROCEDURE — 31575 DIAGNOSTIC LARYNGOSCOPY: CPT | Mod: PBBFAC | Performed by: NURSE PRACTITIONER

## 2023-06-19 PROCEDURE — 3079F PR MOST RECENT DIASTOLIC BLOOD PRESSURE 80-89 MM HG: ICD-10-PCS | Mod: CPTII,,, | Performed by: NURSE PRACTITIONER

## 2023-06-19 PROCEDURE — 3079F DIAST BP 80-89 MM HG: CPT | Mod: CPTII,,, | Performed by: NURSE PRACTITIONER

## 2023-06-19 PROCEDURE — 1159F PR MEDICATION LIST DOCUMENTED IN MEDICAL RECORD: ICD-10-PCS | Mod: CPTII,,, | Performed by: NURSE PRACTITIONER

## 2023-06-19 RX ORDER — LIDOCAINE HYDROCHLORIDE 40 MG/ML
1 INJECTION, SOLUTION RETROBULBAR ONCE
Status: DISPENSED | OUTPATIENT
Start: 2023-06-19

## 2023-06-19 RX ORDER — CEVIMELINE HYDROCHLORIDE 30 MG/1
30 CAPSULE ORAL 3 TIMES DAILY
Qty: 90 CAPSULE | Refills: 11 | Status: SHIPPED | OUTPATIENT
Start: 2023-06-19 | End: 2023-11-21 | Stop reason: CLARIF

## 2023-06-19 RX ORDER — OLMESARTAN MEDOXOMIL 20 MG/1
20 TABLET ORAL
COMMUNITY
Start: 2023-04-04

## 2023-06-19 NOTE — PROGRESS NOTES
The scope used for the exam was:  Flexible scope ENF-P4  Serial Number:  1)    [id number]    []   2)    [id number]    []   3)    [id number]    []   4)    [id number]    []   5)    [id number]    []   6)    [id number]    [x]       The scope used for the exam was:  Rigid scope   Serial Number:  1)   [id number]    []   2)   [id number]    []   3)   [id number]    []   4)    3384   []   5)    0824   []   6)    5554   []     7)   7425    []   8)   2240    []   9)   1109    []

## 2023-06-21 ENCOUNTER — OFFICE VISIT (OUTPATIENT)
Dept: HEMATOLOGY/ONCOLOGY | Facility: CLINIC | Age: 58
End: 2023-06-21
Payer: COMMERCIAL

## 2023-06-21 DIAGNOSIS — Z85.818 HISTORY OF CANCER TONSIL: Primary | ICD-10-CM

## 2023-06-21 DIAGNOSIS — G62.9 NEUROPATHY: ICD-10-CM

## 2023-06-21 PROCEDURE — 4010F ACE/ARB THERAPY RXD/TAKEN: CPT | Mod: CPTII,95,, | Performed by: NURSE PRACTITIONER

## 2023-06-21 PROCEDURE — 4010F PR ACE/ARB THEARPY RXD/TAKEN: ICD-10-PCS | Mod: CPTII,95,, | Performed by: NURSE PRACTITIONER

## 2023-06-21 PROCEDURE — 1160F RVW MEDS BY RX/DR IN RCRD: CPT | Mod: CPTII,95,, | Performed by: NURSE PRACTITIONER

## 2023-06-21 PROCEDURE — 1159F PR MEDICATION LIST DOCUMENTED IN MEDICAL RECORD: ICD-10-PCS | Mod: CPTII,95,, | Performed by: NURSE PRACTITIONER

## 2023-06-21 PROCEDURE — 1160F PR REVIEW ALL MEDS BY PRESCRIBER/CLIN PHARMACIST DOCUMENTED: ICD-10-PCS | Mod: CPTII,95,, | Performed by: NURSE PRACTITIONER

## 2023-06-21 PROCEDURE — 99214 PR OFFICE/OUTPT VISIT, EST, LEVL IV, 30-39 MIN: ICD-10-PCS | Mod: 95,,, | Performed by: NURSE PRACTITIONER

## 2023-06-21 PROCEDURE — 99214 OFFICE O/P EST MOD 30 MIN: CPT | Mod: 95,,, | Performed by: NURSE PRACTITIONER

## 2023-06-21 PROCEDURE — 1159F MED LIST DOCD IN RCRD: CPT | Mod: CPTII,95,, | Performed by: NURSE PRACTITIONER

## 2023-06-21 RX ORDER — GABAPENTIN 300 MG/1
CAPSULE ORAL
Qty: 60 CAPSULE | Refills: 2 | Status: SHIPPED | OUTPATIENT
Start: 2023-06-21

## 2023-06-21 NOTE — PROGRESS NOTES
Cancer Center at [facility]    PATIENT: Jil Duque  MRN: [id number]  DATE: 6/21/2023    Diagnosis: History of squamous cell carcinoma of right tonsil, p16 positive    Reason for follow-up:  -History of squamous cell carcinoma of right tonsil, p16 positive    Oncologic History:   Past medical history: NIDDM (she lost 90 lbs in the course of chemoradiation therapy for tonsillar cancer and has been off metformin since March 2020). Partial hysterectomy in 2007 (abnormal cells in uterus; no malignancy). Bladder lift (at the same time as partial hysterectomy in 2007). Essential hypertension. Dyslipidemia. Never smoked.  Social history: Single. Lives in [place], [place]. Has 4 children. Used to work as a manager at [employer]; laid off due to COVID pandemic. Never smoked. No history of illicit drug abuse. No history of alcohol abuse. History of oral sexual activity.   Family history: Negative for malignancy.  Health maintenance:  -Screening mammogram July 2020 at breast clinic, [place], [place], apparently unremarkable  -Colonoscopy 2 years back (2018) at [facility] (family history of colon polyps), unremarkable  Menstrual and OB/GYN history: Status post partial hysterectomy in 2007 for abnormal uterine cells.    History of present illness:  57-year-old female referred by Dr. Nina Munoz with squamous cell carcinoma of left tonsil.    She saw an ENT in [place] in 2019 for evaluation of chronic recurrent sore throat and foreign body sensation in throat which had lasted for 1 year and not responded to antibiotic therapy. Apparently, biopsy showed cancer of tonsil.  Her ENT physician referred her to [facility] for treatment.    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  Stage I or stage II.  Enrolled in phase 2 study involving induction chemotherapy with  ipilimumab + nivolumab (IMVAX trial for HPV positive squamous cell carcinoma of tonsil)  Began concurrent chemotherapy and proton radiation to bilateral neck on 10/14/2019, completing 11/24/2019 (or 10/15/2019-11/27/2019, not clear)  Records are very brief and confusing at the time of dictation.  According to her, radiation therapy lasted from 10/15/2019-11/27/2019.  According to her, she received immunotherapy every 2 weeks x 6 cycles, over a period of 12 weeks (she does not remember exact dates but we have requested more records from [facility]  90 lbs unintentional weight loss through 05/26/2020; came off metformin in March 2020 (history of NIDDM)  -No disease recurrence (CT soft of the neck with contrast: 05/26/2020)    Was followed by Dr. Joanna Lawson, oncologist, at [facility]    Now referred to [facility] oncology for ongoing care/surveillance after losing medical insurance, unable to follow-up with [facility] anymore.    She has established ongoing ENT care with Dr. David Bowden in [place].  She says that she visited with him in early September; apparently, FFL exam was negative for tumor recurrence.  According to her, Dr. Bowden wanted to order a neck CT as well as PET CT scan.  However, Ms. Duque feels well, without any new, concerning, or progressive symptoms of concern.    Sequence of events as per available records ([facility]    07/10/2019: Biopsy right tonsil:  -At least superficially invasive squamous cell carcinoma, moderately differentiated  HPV RNA ADAM panel:  -HPV high risk cocktail: Detected  -HPV high risk 16/18: Not detected  -HPV low risk cocktail: Not detected    cT2 cN1 cM0, p16+    Treatment plan:   [id number] ipilimumab and nivolumab in combination with radiation therapy  -Nivolumab 370 mg IV x2 cycles  -Ipilimumab 123 mg IV x2 cycles  -Treatment dates: 09/03/2019-05/26/2020  -Line of treatment: Neoadjuvant therapy  -Treatment goal: Life  prolongation  -Radiation therapy: 10/15/2019-11/27/2019 05/26/2020: Physician evaluation (Dr. Joanna Yates):  Ageusia; early satiety; gradual weight loss continuing; has lost 90 lbs but remains overweight with BMI 27.4; off metformin; mild xerostomia; persistent oral sensitivity; intermittent ear congestion; feeling very well generally. No adenopathy.    History of HPV positive T3 N1 M0 squamous cell carcinoma of right tonsil. Enrolled on phase 2 study involving induction chemotherapy with ipilimumab and nivolumab. She began concurrent immunotherapy and proton radiation to bilateral neck on 10/14/2019, completing 11/24/2019 (IMVAX trial for HPV positive squamous cell carcinoma of tonsil)    05/26/2020: Physician evaluation: MARTHA per neck CT    05/26/2020: CT soft tissue of the neck with contrast (comparison: PET/CT: 02/24/2020; prior posttreatment CT: 01/21/2020):  Evolving postradiation changes; no evidence of disease recurrence    05/26/2020: Modified barium swallow (post radiation 3-6-month MBS):  Significant xerostomia that is impacting oral intake.  Continues to lose weight.  Eats raw carrots and celery. Oral liquids. Dentition full/near full dentate. No dentures. No current feeding tube. Intelligible speech. No tracheostomy. Difficulty swallowing solids. No dysarthria.  Mild pharyngeal dysphagia with a greater impact to the efficiency of swallow. Reduced base of tongue retraction resulting in mild residue at the level of the vallecula, cleared with multiple effortful swallows. 1 instance of silent penetration. Etiology of dysphagia is consistent with history of radiation.  Recommendation: Regular diet with thin liquids; maintain swallowing exercises; effortful swallow before use of a liquid wash; MBS 18-12 months post XRT.   Subjective:   Interval History: 6/21/2023: Ms. Duque was to be seen via telemed video visit. She was unable to connect to telemed on her end. So we preceded with telephone call visit.    She was at her place of work.   Her only complaint today is ongoing neuropathy affecting her hands and feet. 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   Tingling and pain especially when she tries to open a jar or bottle. Has been taking Gabapentin one tablet daily since last visit 6 months ago, but does not feel it is helping.   She otherwise feels well, and reports she is gaining weight back. Appetite is getting better, taste changes(treatment related) are getting better and he swallowing is better.   She still has to watch the texture of foods(rice is hard to swallow, and she has to cut her meat up fine). No difficulty swallowing liquids. No pain with swallowing.   She reports some acid reflux when asked. Thinks food related, but reports a very rare and when presents resolves with use of Rolaids.   She is followed by ENT. Last visit was on 6/19/2023. Reviewed note and no evidence of recurrence of cancer.   Her insurance stopped covering Evoxac. Changed by ENT on 6/19/2023 to pilocarpine. She is awaiting arrival in mail. Treating dry mouth. States Evoxac worked well. She reports no dental issues.     Past Medical History:   Diagnosis Date    Cancer July 2019    Radiation Oct 2019     Past Surgical History:   Procedure Laterality Date    EYE SURGERY  2001    HYSTERECTOMY      left foream      right heel       Family History   Problem Relation Age of Onset    Diabetes Maternal Grandmother     Hypertension Maternal Grandmother     Osteoarthritis Maternal Grandmother      Social History:  reports that she has never smoked. She has never used smokeless tobacco. She reports that she does not drink alcohol and does not use drugs.    Allergies:  Review of patient's allergies indicates:  No Known Allergies    Medications:  Current Outpatient Medications   Medication Sig Dispense Refill    cevimeline (EVOXAC) 30 mg capsule Take 1 capsule (30 mg total) by mouth 3 (three) times daily. 90  capsule 11    clotrimazole-betamethasone 1-0.05% (LOTRISONE) cream clotrimazole-betamethasone 1 %-0.05 % topical cream      fluticasone propionate (FLONASE) 50 mcg/actuation nasal spray 2 sprays (100 mcg total) by Each Nostril route once daily. (Patient not taking: Reported on 6/19/2023) 15.8 mL 11    gabapentin (NEURONTIN) 300 MG capsule TAKE 1 CAPSULE(300 MG) BY MOUTH EVERY EVENING 30 capsule 2    ketoconazole (NIZORAL) 2 % shampoo ketoconazole 2 % shampoo   SHAMPOO TWICE WEEKLY THEN RINSE      olmesartan (BENICAR) 20 MG tablet Take 20 mg by mouth.      pilocarpine (SALAGEN) 5 MG Tab Take 1 tablet (5 mg total) by mouth 3 (three) times daily. (Patient not taking: Reported on 6/19/2023) 90 tablet 11    traMADoL (ULTRAM) 50 mg tablet tramadol 50 mg tablet      zinc sulfate (ZINCATE) 50 mg zinc (220 mg) capsule Take 220 mg by mouth.       Current Facility-Administered Medications   Medication Dose Route Frequency Provider Last Rate Last Admin    LIDOcaine (PF) 40 mg/mL (4 %) injection 1 mL  1 mL Other 1 time in Clinic/HOD Jack Real MD        LIDOcaine (PF) 40 mg/mL (4 %) injection 1 mL  1 mL Other Once GRANT Fall        phenylephrine HCL 1% nasal spray 1 spray  1 spray Each Nostril 1 time in Clinic/HOD Jack Real MD        phenylephrine HCL 1% nasal spray 1 spray  1 spray Each Nostril 1 time in Clinic/HOD GRANT Fall           Review of Systems: A complete 12 point review of systems was done in full, with pertinent positives listed in interval history. Remainder of ROS was done in full and are negative.     ECOG Performance Status: 0   Objective:     Physical Exam:   No vitals or PE done, as this was a phone call visit.     Laboratory results:   CBC  Lab Results   Component Value Date    WBC 4.26 (L) 06/19/2023    RBC 4.10 (L) 06/19/2023    HGB 12.1 06/19/2023    HCT 36.4 (L) 06/19/2023    MCV 88.8 06/19/2023    MCH 29.5 06/19/2023    MCHC 33.2 06/19/2023    RDW 13.3 06/19/2023      06/19/2023    MPV 10.5 (H) 06/19/2023        CMP  Lab Results   Component Value Date     06/19/2023    K 4.0 06/19/2023    CO2 26 06/19/2023    BUN 10.4 06/19/2023    CREATININE 0.76 06/19/2023    CALCIUM 9.0 06/19/2023    ALBUMIN 3.6 06/19/2023    BILITOT 0.5 06/19/2023    ALKPHOS 65 06/19/2023    AST 15 06/19/2023    ALT 8 06/19/2023    EGFRNONAA 91 04/19/2022      TSH  Lab Results   Component Value Date    TSH 0.822 06/19/2023     Assessment:   #Squamous cell carcinoma of right tonsil, p16 positive  (Diagnosed and [facility] at [facility])  -Right tonsil biopsy (07/10/2019): Squamous cell carcinoma; HPV high risk cocktail detected  -cT2 cN1 M0 (stage I) or cT3 cN1 M0 (stage II)  -Treated with ipilimumab + nivolumab in combination with (concurrent) RT (on protocol) (phase 2, IMVAX trial for HPV positive squamous cell carcinoma of tonsil)  -Nivolumab 370 mg IV x2 cycles  -Ipilimumab 123 mg IV x2 cycles  -Treatment dates: 09/03/2019-05/26/2020   -Line of treatment: Neoadjuvant therapy  -Radiation therapy: 10/15/2019-11/27/2019  -No recurrence on PET/CT (02/04/2020)  -No disease recurrence (CT neck: 05/26/2020)  -As of 05/26/2020: Ageusia; early satiety; 90 lbs weight loss; off metformin (previous history of NIDDM; off Metformin after weight loss); persistent oral sensitivity; no adenopathy; generally, doing very well  -01/06/2021: CT soft tissue of the neck with contrast: No cervical lymphadenopathy  -01/06/2021: CT chest with contrast: No intrathoracic metastasis  -01/26/2021: ENT follow-up: Flexible laryngoscopy: MARTHA; follow-up in 2 months  -01/16/2023: CT SOFT TISSUE NECK WITH CONTRAST. No acute pathology or metastatic disease identified at the neck or upper chest.  No significant change compared to a CT 12/03/2021.    -6/19/2023: Flexible laryngoscopy negative     -MARTHA as of 6/19/2023 (Flexible laryngoscopy; ENT examination); Laboratory studies reviewed and are stable. Normal thyroid studies.      Plan:     Plan:     -Maintain close follow-up with ENT for surveillance  -Follow up with Gynecology for establishment and recommended screening; she is seeing PCP for ordering of mammogram.   -Gabapentin 300mg - increase dose to 2 tablets daily at bedtime. Management of neuropathy to bilateral hands and feet.    She is to call office in the interim if medication not improving symptoms.   -Follow-up visit with us in 6 months, with CBC, CMP, and TSH level; earlier, if any concerns in the interim    Above discussed with her. All questions answered.  She understands and agrees this plan.      Recommended surveillance per NCCN guidelines:  1. History and physical examination including a complete head and neck exam; mirror and fiberoptic examination as clinically indicated every 1-3 months in year 1; every 2-6 months in the year 2; every 4-8 months in years 3-5; every 12 months beyond 5 years;    2. If FDG PET/CT at 3 months posttreatment is negative, there are no data to support substantial benefit for further routine imaging in an asymptomatic patient with negative exam.  Additional posttreatment imaging is indicated for worrisome or equivocal signs/symptoms.  Routine annual imaging (repeat use of pretreatment imaging modality) may be indicated to visualize areas inaccessible to routine clinical examination (deep-seated anatomic locations for areas obscured by extensive treatment change).    3. TSH every 6-12 months if neck irradiated;    4. Dental evaluation recommended for oral cavity and sites exposed to significant intraoral radiation treatment    5. Supportive care and rehabilitation: Speech/hearing and swallow evaluation and rehabilitation as clinically indicated; nutritional evaluation and rehabilitation as clinically indicated until nutritional status is stabilized; ongoing surveillance for depression; smoking cessation and

## 2023-07-24 ENCOUNTER — PATIENT MESSAGE (OUTPATIENT)
Dept: RESEARCH | Facility: HOSPITAL | Age: 58
End: 2023-07-24
Payer: COMMERCIAL

## 2023-07-31 ENCOUNTER — PATIENT MESSAGE (OUTPATIENT)
Dept: RESEARCH | Facility: HOSPITAL | Age: 58
End: 2023-07-31
Payer: COMMERCIAL

## 2023-11-21 ENCOUNTER — OFFICE VISIT (OUTPATIENT)
Dept: OTOLARYNGOLOGY | Facility: CLINIC | Age: 58
End: 2023-11-21
Payer: COMMERCIAL

## 2023-11-21 VITALS
BODY MASS INDEX: 33.15 KG/M2 | DIASTOLIC BLOOD PRESSURE: 89 MMHG | SYSTOLIC BLOOD PRESSURE: 136 MMHG | TEMPERATURE: 98 F | HEIGHT: 65 IN | WEIGHT: 199 LBS | RESPIRATION RATE: 16 BRPM | HEART RATE: 65 BPM

## 2023-11-21 DIAGNOSIS — K11.7 XEROSTOMIA DUE TO RADIOTHERAPY: ICD-10-CM
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
## 2023-11-21 DIAGNOSIS — C09.9 SQUAMOUS CELL CARCINOMA OF RIGHT TONSIL: Primary | ICD-10-CM

## 2023-11-21 PROCEDURE — 31575 DIAGNOSTIC LARYNGOSCOPY: CPT | Mod: PBBFAC | Performed by: OTOLARYNGOLOGY

## 2023-11-21 PROCEDURE — 99214 OFFICE O/P EST MOD 30 MIN: CPT | Mod: PBBFAC,25 | Performed by: OTOLARYNGOLOGY

## 2023-11-21 NOTE — PROGRESS NOTES
The scope used for the exam was:  Flexible scope ENF-P4  Serial Number:  1)    [id number]    []   2)    [id number]    [x]   3)    [id number]    []   4)    [id number]    []   5)    [id number]    []   6)    [id number]    []       The scope used for the exam was:  Rigid scope   Serial Number:  1)   [id number]    []   2)   [id number]    []   3)   [id number]    []   4)    [id number]   []   5)    0824   []   6)    5554   []     7)   7425    []   8)   2240    []   9)   1109    []

## 2023-11-21 NOTE — PROGRESS NOTES
"Patient Name: Jil Duque   YOB: 1965     Chief Complaint:   Chief Complaint   Patient presents with    Follow-up     Ca surveillance          History of Present Illness:  12/8/20: Is a 55-year-old female who was seen by me in 2019 and private practice, a biopsy of the right tonsil was done and returned squamous cell carcinoma, HPV positive. She elects to go to [facility] where she received radiation therapy, had immunotherapy with proton beam radiation, which she completed in November 2019. She had post treatment scans including a PET scan and all were normal by her history. No notes from [facility] are available at this time, I reviewed the notes in the chart She was staged as a T3 N1 M0, She currently is experiencing some dysphagia, but this is gradually improving. She has lost a total of 90 pounds but is slowly gaining back. Her last CT scan was 8 months ago. She had been seen Dr. Oneal Bowden in [place], the notes in the chart reflect he did a fiberoptic endoscopy and was normal 3 months ago. She has since lost her job because of COVID and is now on Medicaid and was referred here for follow-up.    1/26/21: Returns to clinic today. No complaints or issues. No otalgia, weight loss, new dysphagia or odynophagia, changes in voice or breathing. Has a hard time eating meat due to longstanding dysphagia to it, but still able to get it down - feels it sometimes "gets stuck" but this is not new. Recent CT scans demonstrated no evidence of disease.    4/20/21: Doing well. Denies dysphagia, dysphonia, pain, wt loss, LAD. C/o recent nasal congestion and PND. Reports using flonase in the past which was helpful. Occasional nasal dryness. 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 Patient has no complaints of any sore throat, hemoptysis, or swelling in the neck. She does have periodic difficulty swallowing " "foods such as meats but otherwise has no dysphagia. She does admit to having problems with dry mouth since undergoing her treatment. She has been using Biotene but despite this she still has problems with dry mouth. This is worse at night.  The patient's oral intake does continue to improve. She had indicated that she does continue to gain some weight. She had had problems with altered sense of taste since her treatments but this is also improving though it is not back to its "normal" condition.  She does have some problems with chronic rhinitis and is currently using fluticasone nasal spray but does so on an every other day basis. She finds that it does help some. She has noticed that sometimes congestion may be worse at night and she is not sure if this may be related to her dryness of the mouth being worse at night. It also been given samples of a saline irrigation kit and she did find that that was also helpful with her nasal symptoms but she had not bought any refills.    9/21/21: Doing well overall. States she is having more uppercase PET scan\been eating more for pleasure and gaining weight. Denied any fatigue weakness myalgia. 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 States that she was doing better with her mucus and congestion when she was doing the dryness. Dr. Mcarthur gave her a last visit but has been out. Otherwise no other complaints    12/3/21: 12/8/20: Is a 55-year-old female who was seen by me in 2019 and private practice, a biopsy of the right tonsil was done and returned squamous cell carcinoma, HPV positive. She elects to go to [facility] where she received radiation therapy, had immunotherapy with proton beam radiation, which she completed in November 2019. She had post treatment scans including a PET scan and all were normal by her history. No notes from [facility] are available at this time, I reviewed the notes in the chart She " "was staged as a T3 N1 M0, She currently is experiencing some dysphagia, but this is gradually improving. She has lost a total of 90 pounds but is slowly gaining back. Her last CT scan was 8 months ago. She had been seen Dr. Oneal Bowden in [place], the notes in the chart reflect he did a fiberoptic endoscopy and was normal 3 months ago. She has since lost her job because of COVID and is now on Medicaid and was referred here for follow-up.    1/26/21: Returns to clinic today. No complaints or issues. No otalgia, weight loss, new dysphagia or odynophagia, changes in voice or breathing. Has a hard time eating meat due to longstanding dysphagia to it, but still able to get it down - feels it sometimes "gets stuck" but this is not new. Recent CT scans demonstrated no evidence of disease.    4/20/21: Doing well. Denies dysphagia, dysphonia, pain, wt loss, LAD. C/o recent nasal congestion and PND. Reports using flonase in the past which was helpful. Occasional nasal dryness. 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 Patient has no complaints of any sore throat, hemoptysis, or swelling in the neck. She does have periodic difficulty swallowing foods such as meats but otherwise has no dysphagia. She does admit to having problems with dry mouth since undergoing her treatment. She has been using Biotene but despite this she still has problems with dry mouth. This is worse at night.  The patient's oral intake does continue to improve. She had indicated that she does continue to gain some weight. She had had problems with altered sense of taste since her treatments but this is also improving though it is not back to its "normal" condition.  She does have some problems with chronic rhinitis and is currently using fluticasone nasal spray but does so on an every other day basis. She finds that it does help some. She has noticed that sometimes " congestion may be worse at night and she is not sure if this may be related to her dryness of the mouth being worse at night. It also been given samples of a saline irrigation kit and she did find that that was also helpful with her nasal symptoms but she had not bought any refills.    9/21/21: Doing well overall. States she is having more uppercase PET scan\been eating more for pleasure and gaining weight. Denied any fatigue weakness myalgia. 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 States that she was doing better with her mucus and congestion when she was doing the dryness. Dr. Mcarthur gave her a last visit but has been out. Otherwise no other complaints    12/3/21: Doing well overall. Appetite good, maintaining weight. Denied fatigue weakness myalgia. Still has some neuropathy States that she was doing better with her mucus and congestion when she was doing the dryness. The sialogen is helping for the xerostomia edition her taste is slowly coming back. Otherwise no other complaints. Last TSH was done in October and was normal. [1]    4/6/2022: Patient presenting in surveillance. At this time she notes she has some mucus pooling within her oropharynx and some minor levels of dysphagia however, she is continuing to maintain weight at this time. In fact she is gaining weight at this time. She denies any fatigue, weakness, myalgia, new onset pain in her neck or mouth. She does not have any lymphadenopathy that she has noticed. Additionally patient notes this year she has had increased rhinitis. She has not noticed this prior or is not specific to any seasons. She is doing nasal irrigations that is helping her. She notes her left nostril feels a little tender.     07/26/2022: Doing well per report. She denies any dysphagia apart from baseline to certain things following tx. Denies odynophagia, otalgia, LAD. She has been gaining weight. Salagen helps & she used it  prn. She has been using saline nasal rinses with help rhinitis/PND but has not tried flonase following NSI.    December 8, 2022: Patient presents today for follow-up of her squamous cell carcinoma of the tonsil.  She is doing well at this time.  She has no complaints of sore throat, difficulty swallowing, swelling in the neck, or changes in weight.  She does continue to use Salagen for her dry mouth and fluticasone nasal spray which does seem to help with rhinitis and nasal congestion.  She was seen by Dr. Galvez earlier today.  She had lab work done which included a CBC which was unremarkable as well as metabolic profile which was within normal limits except for a chloride of 109.  TSH level was not done. No new problems today.    6/19/23: Doing well. Denies any dysphagia, dysphonia, or type B symptoms. States salagen had been working well for xerostomia but her insurance stopped covering it.     November 21, 2023:   Patient presents today for follow-up of squamous cell carcinoma of the right tonsil.  Patient is doing well.  She has no complaints of sore throat, difficulty swallowing, swelling in the neck, or hemoptysis.  Only problem is that of some xerostomia which has worsened since she had to discontinue her pilocarpine due to lack of insurance coverage for the medication in the cost of the medication.  She is been using over-the-counter treatments and increasing her water intake which does seem to be helpful somewhat.  She has no other new problems today.    Past Medical History:  Past Medical History:   Diagnosis Date    Cancer July 2019    Radiation Oct 2019     Past Surgical History:   Procedure Laterality Date    EYE SURGERY  2001    HYSTERECTOMY      left foream      right heel         Review of Systems:  Unremarkable except as mentioned above.    Current Medications:  Current Outpatient Medications   Medication Sig    clotrimazole-betamethasone 1-0.05% (LOTRISONE) cream clotrimazole-betamethasone 1  %-0.05 % topical cream    ketoconazole (NIZORAL) 2 % shampoo ketoconazole 2 % shampoo   SHAMPOO TWICE WEEKLY THEN RINSE    pilocarpine (SALAGEN) 5 MG Tab Take 1 tablet (5 mg total) by mouth 3 (three) times daily.    pilocarpine (SALAGEN) 5 MG Tab Take 1 tablet (5 mg total) by mouth 3 (three) times daily.    traMADoL (ULTRAM) 50 mg tablet tramadol 50 mg tablet    zinc sulfate (ZINCATE) 50 mg zinc (220 mg) capsule Take 220 mg by mouth.     Current Facility-Administered Medications   Medication    LIDOcaine (PF) 40 mg/mL (4 %) injection 1 mL    phenylephrine HCL 1% nasal spray 1 spray        Allergies:  Review of patient's allergies indicates:  No Known Allergies     Physical Exam:  Vital signs:   Vitals:    12/08/22 1254 12/08/22 1255 12/08/22 1258   BP: (!) 175/91 (!) 169/94 (!) 170/100  Comment: checked bp manually, pt states she did walk from [place] to [address], and denies any nause and dizziness. pt does take bp medicine   Pulse: (!) 56     Temp: 97.8 °F (36.6 °C)     Weight: 86.5 kg (190 lb 9.6 oz)       General:  Well-developed well-nourished female in no acute distress.  Voice is normal.  Head and face:  Normocephalic.  No facial lesions.  No temporomandibular joint tenderness or click.  Ears:  Right ear-auricle is normally developed.  External auditory canal is clear.  Tympanic membrane is nonerythematous.  No middle ear effusion.  Left ear-auricle is normally developed.  External auditory canal is clear.  Tympanic membrane is nonerythematous.  No middle ear effusion.  Nose:  Nasal dorsum is unremarkable.  No significant septal deviation.  No significant intranasal congestion.  Secretions are clear.  Oral cavity and oropharynx:  Tongue and floor mouth are without lesions.  Mucosa is moist.  No pharyngeal erythema or exudates.  No oropharyngeal masses.  No tonsillar hypertrophy.  No masses in the right tonsillar bed or the oropharynx.    Neck:  Supple without adenopathy or thyromegaly.  Trachea is in the  midline.  Parotid and submandibular glands are normal to palpation without masses or tenderness.  Eyes:  Extraocular muscles intact.  No nystagmus.  No exophthalmos or enophthalmos.  Neurologic:  Alert and oriented.  Cranial nerves 2-12 are grossly normal.    Procedure note: Flexible laryngoscopy:   The nose was prepped with 1% phenyleprine nasal spray and tetracaine topically. The flexible fiberoptic laryngoscope was introduced into the right nostril and advanced. Examination of the nose showed the above mentioned findings. Examination of the nasopharynx showed no nasopharyngeal masses or eustachian tube obstruction. Examination of the base of tongue showed no masses or lingual tonsil hypertrophy. Laryngeal examination showed normal vocal cord mobility bilaterally.  No laryngeal masses. Examination of the hypopharynx showed no masses or pooling of secretions in the piriform sinuses.    Labs:       Imaging:  Narrative & Impression  EXAMINATION:  CT SOFT TISSUE NECK WITH CONTRAST     CLINICAL HISTORY:  57-year-old woman for follow-up of right tonsillar squamous cell carcinoma diagnosed in 2019, post radiation therapy and immunotherapy.     TECHNIQUE:  Thin slice helical CT imaging was performed from above the skull base to the lung apices through the neck following the administration of 100 mL Omnipaque 300 low osmolar intravenous contrast and with coronal and sagittal reformatted images generated from the axial data set per routine protocol.  Automatic dose modulation and/or weight based mA/kv utilized to achieve as low as reasonable radiation dose.     COMPARISON:  Neck CT with contrast 12/03/2021.     FINDINGS:  There is no pathology identified at the visualized caudal brain or skull base.  There is no neck mass or cervical lymphadenopathy.  There is no acute pathology at the pharynx or parapharyngeal soft tissues.  The larynx is normal.  The parotid and submandibular glands are symmetric and demonstrate no  pathology aside from atrophy at the submandibular glands.  No prevertebral or paravertebral soft tissue abnormality.  There is no significant abnormality along the carotid spaces.  The thyroid gland is normal and there is no abnormality at the thoracic inlet are supraclavicular fossa a. no acute pathology or significant abnormality at the visualized upper chest.  Heart size is normal.  No acute skeletal abnormality or osseous lesion.  There is advanced degenerative disc disease at C6-C7.  There is mild mucosal thickening and thin mucous retention cyst formation at the bilateral maxillary sinuses.  Additional small mucous retention cyst at the right sphenoid sinus.  No paranasal sinus fluid to suggest acute sinusitis.     Impression:     No acute pathology or metastatic disease identified at the neck or upper chest.  No significant change compared to a CT 12/03/2021.        Electronically signed by: Truman Cruz  Date:                                            01/16/2023  Time:                                           12:27    Assessment/Plan:  57-year-old female with T2 N1 M0 P 16 positive squamous cell carcinoma of the right tonsil treated with radiation therapy and immunotherapy at [facility] which was completed in November of 2019.  No evident disease at this time.    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     Plan:  Continue adequate oral intake of water to help with xerostomia.    Biotene for xerostomia.    Continue fluticasone.    Follow-up in 6 months.      HEAD & NECK CANCER SURVEILLANCE   Radiation and/or Chemotherapy     Medical Oncologist: Ryan (Last seen:  06/21/2023)  Radiation Oncologist: MD Broussard (Last seen: ?)    Primary tumor: T2N1M0, p16+ squamous cell carcinoma of right tonsil    Date of Diagnosis: 7/10/2019  Induction Chemotherapy: Yes; with ipilumamab &nivolumab  Therapy: See below  Completion Date: See below    Pertinent Treatment History:    Diagnosed with T2N1M0 p16+ squamous cell carcinoma of right tonsil on 7/10/2019; treated at [facility] with XRT with induction chemotherapy (ipilumamab & nivolumab) 10/15/2019-11/27/2019 & neoadjuvant therapy (ipilumamab & nivolumab) 9/13/2019-5/26/2020.    Place date if completed   3 6 12 18 24 36 48 60   CT Neck X X X 1/6/21 12/3/21 X 1/16/23 X   PET/CT X          CXR  X X 1/6/21 (CT) X X 6/19/23 X   TFT X  X  4/19/22 X 6/14/23 X     Current Surveillance Interval: 6 months          Jack Real M.D.

## 2023-12-14 ENCOUNTER — OFFICE VISIT (OUTPATIENT)
Dept: HEMATOLOGY/ONCOLOGY | Facility: CLINIC | Age: 58
End: 2023-12-14
Attending: INTERNAL MEDICINE
Payer: COMMERCIAL

## 2023-12-14 VITALS
OXYGEN SATURATION: 100 % | HEART RATE: 68 BPM | DIASTOLIC BLOOD PRESSURE: 83 MMHG | BODY MASS INDEX: 32.3 KG/M2 | HEIGHT: 66 IN | WEIGHT: 201 LBS | SYSTOLIC BLOOD PRESSURE: 129 MMHG | TEMPERATURE: 98 F

## 2023-12-14 DIAGNOSIS — Z08 ENCOUNTER FOR FOLLOW-UP SURVEILLANCE OF TONSILLAR CANCER: ICD-10-CM

## 2023-12-14 DIAGNOSIS — T45.1X5A CHEMOTHERAPY-INDUCED NEUROPATHY: ICD-10-CM

## 2023-12-14 DIAGNOSIS — Z85.818 ENCOUNTER FOR FOLLOW-UP SURVEILLANCE OF TONSILLAR CANCER: ICD-10-CM

## 2023-12-14 DIAGNOSIS — C09.9 SQUAMOUS CELL CARCINOMA OF RIGHT TONSIL: Primary | ICD-10-CM

## 2023-12-14 DIAGNOSIS — K11.7 XEROSTOMIA DUE TO RADIOTHERAPY: ICD-10-CM
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
## 2023-12-14 DIAGNOSIS — G62.0 CHEMOTHERAPY-INDUCED NEUROPATHY: ICD-10-CM

## 2023-12-14 PROCEDURE — 1160F RVW MEDS BY RX/DR IN RCRD: CPT | Mod: CPTII,,, | Performed by: INTERNAL MEDICINE

## 2023-12-14 PROCEDURE — 1160F PR REVIEW ALL MEDS BY PRESCRIBER/CLIN PHARMACIST DOCUMENTED: ICD-10-PCS | Mod: CPTII,,, | Performed by: INTERNAL MEDICINE

## 2023-12-14 PROCEDURE — 99214 PR OFFICE/OUTPT VISIT, EST, LEVL IV, 30-39 MIN: ICD-10-PCS | Mod: S$PBB,,, | Performed by: INTERNAL MEDICINE

## 2023-12-14 PROCEDURE — 3079F DIAST BP 80-89 MM HG: CPT | Mod: CPTII,,, | Performed by: INTERNAL MEDICINE

## 2023-12-14 PROCEDURE — 3074F SYST BP LT 130 MM HG: CPT | Mod: CPTII,,, | Performed by: INTERNAL MEDICINE

## 2023-12-14 PROCEDURE — 3079F PR MOST RECENT DIASTOLIC BLOOD PRESSURE 80-89 MM HG: ICD-10-PCS | Mod: CPTII,,, | Performed by: INTERNAL MEDICINE

## 2023-12-14 PROCEDURE — 99214 OFFICE O/P EST MOD 30 MIN: CPT | Mod: S$PBB,,, | Performed by: INTERNAL MEDICINE

## 2023-12-14 PROCEDURE — 1159F MED LIST DOCD IN RCRD: CPT | Mod: CPTII,,, | Performed by: INTERNAL MEDICINE

## 2023-12-14 PROCEDURE — 4010F PR ACE/ARB THEARPY RXD/TAKEN: ICD-10-PCS | Mod: CPTII,,, | Performed by: INTERNAL MEDICINE

## 2023-12-14 PROCEDURE — 99214 OFFICE O/P EST MOD 30 MIN: CPT | Mod: PBBFAC | Performed by: INTERNAL MEDICINE

## 2023-12-14 PROCEDURE — 3008F BODY MASS INDEX DOCD: CPT | Mod: CPTII,,, | Performed by: INTERNAL MEDICINE

## 2023-12-14 PROCEDURE — 4010F ACE/ARB THERAPY RXD/TAKEN: CPT | Mod: CPTII,,, | Performed by: INTERNAL MEDICINE

## 2023-12-14 PROCEDURE — 1159F PR MEDICATION LIST DOCUMENTED IN MEDICAL RECORD: ICD-10-PCS | Mod: CPTII,,, | Performed by: INTERNAL MEDICINE

## 2023-12-14 PROCEDURE — 3008F PR BODY MASS INDEX (BMI) DOCUMENTED: ICD-10-PCS | Mod: CPTII,,, | Performed by: INTERNAL MEDICINE

## 2023-12-14 PROCEDURE — 3074F PR MOST RECENT SYSTOLIC BLOOD PRESSURE < 130 MM HG: ICD-10-PCS | Mod: CPTII,,, | Performed by: INTERNAL MEDICINE

## 2023-12-14 NOTE — PROGRESS NOTES
History:  Past Medical History:   Diagnosis Date    Cancer July 2019    Radiation Oct 2019   Past medical history: NIDDM (she lost 90 lbs in the course of chemoradiation therapy for tonsillar cancer and has been off metformin since March 2020).  Partial hysterectomy in 2007 (abnormal cells in uterus; no malignancy).  Bladder lift (at the same time as partial hysterectomy in 2007).  Essential hypertension.  Dyslipidemia.  Never smoked.  Social history: Single.  Lives in [place], [place].  Has 4 children.  Used to work as a manager at [employer]; laid off due to COVID pandemic.  Never smoked.  No history of illicit drug abuse.  No history of alcohol abuse.  History of oral sexual activity.    Family history: Negative for malignancy.  Health maintenance:   -Screening mammogram July 2020 at breast clinic, [place], [place], apparently unremarkable   -Colonoscopy 2 years back (2018) at [facility] (family history of colon polyps), unremarkable  Menstrual and OB/GYN history: Status post partial hysterectomy in 2007 for abnormal uterine cells.   Past Surgical History:   Procedure Laterality Date    EYE SURGERY  2001    HYSTERECTOMY      left foream      right heel        Social History     Socioeconomic History    Marital status: Single   Tobacco Use    Smoking status: Never    Smokeless tobacco: Never   Substance and Sexual Activity    Alcohol use: Never    Drug use: Never    Sexual activity: Not Currently     Birth control/protection: Abstinence      Family History   Problem Relation Age of Onset    Diabetes Maternal Grandmother     Hypertension Maternal Grandmother     Osteoarthritis Maternal Grandmother         Reason for Follow-up:  History of squamous cell carcinoma of right tonsil, p16 positive, cT2 cN1 M0 (stage I) or cT3 cN1 M0 (stage II), S/P ipilimumab/nivolumab 09/03/2019-05/26/2020, concurrent with radiotherapy 10/15/2019-11/27/2019 (at [facility])  -peripheral  neuropathy, chemotherapy induced    History of Present Illness:   No chief complaint on file.        Oncologic/Hematologic History:  Oncology History   Squamous cell carcinoma of right tonsil   12/8/2022 Initial Diagnosis    Squamous cell carcinoma of right tonsil     12/8/2022 Cancer Staged    Staging form: Pharynx - HPV-Mediated Oropharynx, AJCC 8th Edition  - Clinical: Stage I (cT2, cN1, cM0, p16+)      54-year-old female referred by Dr. Nina Munoz with squamous cell carcinoma of left tonsil.     She saw an ENT in [place] in 2019 for evaluation of chronic recurrent sore throat and foreign body sensation in throat which had lasted for 1 year and not responded to antibiotic therapy.  Apparently, biopsy showed cancer of tonsil.   Her ENT physician referred her to [facility] for treatment.    Records reviewed:   She was diagnosed with HPV positive squamous cell carcinoma of right tonsil at [facility] in July 2019.   Stage I or stage II.   Enrolled in phase 2 study involving induction chemotherapy with ipilimumab + nivolumab (IMVAX trial for HPV positive squamous cell carcinoma of tonsil)   Began concurrent chemotherapy and proton radiation to bilateral neck on 10/14/2019, completing 11/24/2019 (or 10/15/2019-11/27/2019, not clear)   Records are very brief and confusing at the time of dictation.   According to her, radiation therapy lasted from 10/15/2019-11/27/2019.   According to her, she received immunotherapy every 2 weeks x 6 cycles, over a period of 12 weeks (she does not remember exact dates but we have requested more records from [facility]).   90 lbs unintentional weight loss through 05/26/2020; came off metformin in March 2020 (history of NIDDM)   -No disease recurrence (CT soft of the neck with contrast: 05/26/2020)     Was followed by Dr. Joanna Lawson, oncologist, at [facility]     Now referred to [facility] oncology for ongoing  care/surveillance after losing medical insurance, unable to follow-up with MD Broussard anymore.     She has established ongoing ENT care with Dr. David Bowden in [place].   She says that she visited with him in early September; apparently, FFL exam was negative for tumor recurrence.   According to her, Dr. Bowden wanted to order a neck CT as well as PET CT scan.   However, Ms. Duque feels well, without any new, concerning, or progressive symptoms of concern.      Sequence of events as per available records ([facility]):     07/10/2019: Biopsy right tonsil:   -At least superficially invasive squamous cell carcinoma, moderately differentiated   HPV RNA ADAM panel:   -HPV high risk cocktail: Detected   -HPV high risk 16/18: Not detected   -HPV low risk cocktail: Not detected     cT2 cN1 cM0, p16+     Treatment plan: [id number] ipilimumab and nivolumab in combination with radiation therapy   -Nivolumab 370 mg IV x2 cycles   -Ipilimumab 123 mg IV x2 cycles   -Treatment dates: 09/03/2019-05/26/2020   -Line of treatment: Neoadjuvant therapy   -Treatment goal: Life prolongation   -Radiation therapy: 10/15/2019-11/27/2019 05/26/2020: Physician evaluation (Dr. Joanna Yates):   Ageusia; early satiety; gradual weight loss continuing; has lost 90 lbs but remains overweight with BMI 27.4; off metformin; mild xerostomia; persistent oral sensitivity; intermittent ear congestion; feeling very well generally.  No adenopathy.     History of HPV positive T3 N1 M0 squamous cell carcinoma of right tonsil.  Enrolled on phase 2 study involving induction chemotherapy with ipilimumab and nivolumab.  She began concurrent immunotherapy and proton radiation to bilateral neck on 10/14/2019, completing 11/24/2019 (IMVAX trial for HPV positive squamous cell carcinoma of tonsil)     05/26/2020: Physician evaluation: MARTHA per neck CT     05/26/2020: CT soft tissue of the neck with contrast (comparison: PET/CT: 02/24/2020;  prior posttreatment CT: 01/21/2020):   Evolving postradiation changes; no evidence of disease recurrence     05/26/2020: Modified barium swallow (post radiation 3-6-month MBS):   Significant xerostomia that is impacting oral intake.   Continues to lose weight.   Eats raw carrots and celery.  Oral liquids.  Dentition full/near full dentate.  No dentures.  No current feeding tube.  Intelligible speech.  No tracheostomy.  Difficulty swallowing solids.  No dysarthria.   Mild pharyngeal dysphagia with a greater impact to the efficiency of swallow.  Reduced base of tongue retraction resulting in mild residue at the level of the vallecula, cleared with multiple effortful swallows.  1 instance of silent penetration.  Etiology of dysphagia is consistent with history of radiation.   Recommendation: Regular diet with thin liquids; maintain swallowing exercises; effortful swallow before use of a liquid wash; MBS 18-12 months post XRT.    09/24/2020:  Presents for initial oncology consultation.  Overall, feels very well.  Says that she is feeling full of energy.  Has never felt this good before.  Still, ageusia.  No dysphagia, odynophagia, pain in throat, otalgia, speech problems, unusual headaches, focal neurological symptoms, etc.   Has no taste buds.  Therefore, when she tries to eat spicy foods, it irritates her throat.   She experiences some problems swallowing meat due to xerostomia, therefore, has to eat slow.   Constipated for last 3 or 4 months.  Has a bowel movement once every 2-3 days.  Been severely constipated, notices some blood with stool and on toilet before.   To recall, colonoscopy 2 years back (in 2018) at [facility], was apparently unremarkable.   No history of tobacco, alcohol, or illicit drug abuse.  Denies cough or shortness of breath.   Abdominal pain/discomfort only when she is constipated.    Interval History:  [No matching plan found]   [No matching plan found]     04/20/2021:    -01/06/2021: CT soft tissue of the neck with contrast: No cervical lymphadenopathy   -01/06/2021: CT chest with contrast: No intrathoracic metastasis   -01/26/2021: ENT follow-up: Flexible laryngoscopy: MARTHA; follow-up in 2 months   -04/19/2021: CBC and CMP reviewed; CO2 30; hemoglobin 11.8, stable; WBC 3.5; ANC 1.98; TSH 2.2742, normal   Patient interviewed via telemedicine visit.  She was at [facility], awaiting her appointment with ENT.  Doing very well.  No symptoms.  No throat pain, otalgia, difficulty swallowing, hemoptysis, etc.  No weakness, fatigue.  Good appetite.  Good energy.  No unusual headaches or focal neurological symptoms.  No chest pain, cough, or dyspnea.  Never smoked.    12/14/2023:  -01/06/2021:  Surveillance CT chest with contrast:  No metastases  -12/03/2021: Surveillance CT soft tissues of the neck with contrast (comparison:  CT neck 01/06/2021):  No recurrence or metastases  -01/16/2023: Surveillance CT soft tissues of the neck with contrast (CT neck 12/03/2021):  No recurrence or metastases  -06/19/2023: TSH normal  -06/19/2023:  CBC and CMP more less unremarkable and stable  -11/21/2023:  ENT follow-up doing well; no sore throat, difficulty swallowing, swelling in the neck, or hemoptysis; worsening xerostomia; had to discontinue pilocarpine due to lack of insurance coverage for the medication; no recurrent disease on physical exam and FFL exam  -ongoing neuropathy affecting hands and feet, but especially her hands; 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  Tingling and pain, for example, when trying to open a jar or bottle.  Has been taking gabapentin 1 tablet daily for several months.  Gabapentin is not helping.  Rises hard to swallow.  She has to cut a meet fine to be able to swallow.  No difficulty swallowing liquids.  No odynophagia.  Some acid reflux.  -on 06/21/2023, gabapentin dose increased to 600 mg p.o. q.h.s. for management of neuropathy bilateral hands and feet  Labs  reviewed.  CBC, CMP, TSH normal.  Presents for a follow-up visit.  Doing well.  Looks and feels healthy.  Good appetite.  Good energy.  Works at a bank.  No dysphagia, odynophagia, throat pain, otalgia, hemoptysis, etc..  Does not drink or smoke.  ECOG 0.  Follows up with ENT on a regular basis.      Medications:  Current Outpatient Medications on File Prior to Visit   Medication Sig Dispense Refill    clotrimazole-betamethasone 1-0.05% (LOTRISONE) cream clotrimazole-betamethasone 1 %-0.05 % topical cream      fluticasone propionate (FLONASE) 50 mcg/actuation nasal spray 2 sprays (100 mcg total) by Each Nostril route once daily. (Patient not taking: Reported on 6/19/2023) 15.8 mL 11    gabapentin (NEURONTIN) 300 MG capsule Take 2 capsule daily at bedtime. 60 capsule 2    ketoconazole (NIZORAL) 2 % shampoo ketoconazole 2 % shampoo   SHAMPOO TWICE WEEKLY THEN RINSE      olmesartan (BENICAR) 20 MG tablet Take 20 mg by mouth.      traMADoL (ULTRAM) 50 mg tablet tramadol 50 mg tablet      zinc sulfate (ZINCATE) 50 mg zinc (220 mg) capsule Take 220 mg by mouth.       Current Facility-Administered Medications on File Prior to Visit   Medication Dose Route Frequency Provider Last Rate Last Admin    LIDOcaine (PF) 40 mg/mL (4 %) injection 1 mL  1 mL Other 1 time in Clinic/HOD Jack Real MD        LIDOcaine (PF) 40 mg/mL (4 %) injection 1 mL  1 mL Other Once Fatoumata Bishop FNP        phenylephrine HCL 1% nasal spray 1 spray  1 spray Each Nostril 1 time in Clinic/HOD Jack Real MD        phenylephrine HCL 1% nasal spray 1 spray  1 spray Each Nostril 1 time in Clinic/HOD Fatoumata Bishop FNP           Review of Systems:   All systems reviewed and found to be negative except for the symptoms detailed above    Physical Examination:   VITAL SIGNS: There were no vitals filed for this visit.  GENERAL:  In no apparent distress.    HEAD:  No signs of head trauma.  EYES:  Pupils are equal.  Extraocular motions intact.     EARS:  Hearing grossly intact.  MOUTH:  Oropharynx is normal.   NECK:  No adenopathy, no JVD.     CHEST:  Chest with clear breath sounds bilaterally.  No wheezes, rales, rhonchi.    CARDIAC:  Regular rate and rhythm.  S1 and S2, without murmurs, gallops, rubs.  VASCULAR:  No Edema.  Peripheral pulses normal and equal in all extremities.  ABDOMEN:  Soft, without detectable tenderness.  No sign of distention.  No   rebound or guarding, and no masses palpated.   Bowel Sounds normal.  MUSCULOSKELETAL:  Good range of motion of all major joints. Extremities without clubbing, cyanosis or edema.    NEUROLOGIC EXAM:  Alert and oriented x 3.  No focal sensory or strength deficits.   Speech normal.  Follows commands.  PSYCHIATRIC:  Mood normal.    Results for orders placed or performed in visit on 06/19/23   CBC Auto Differential    Narrative    The following orders were created for panel order CBC Auto Differential.  Procedure                               Abnormality         Status                     ---------                               -----------         ------                     CBC with Differential[[id number]]        Abnormal            Final result                 Please view results for these tests on the individual orders.   CBC with Differential   Result Value Ref Range    WBC 4.26 (L) 4.50 - 11.50 x10(3)/mcL    RBC 4.10 (L) 4.20 - 5.40 x10(6)/mcL    Hgb 12.1 12.0 - 16.0 g/dL    Hct 36.4 (L) 37.0 - 47.0 %    MCV 88.8 80.0 - 94.0 fL    MCH 29.5 27.0 - 31.0 pg    MCHC 33.2 33.0 - 36.0 g/dL    RDW 13.3 11.5 - 17.0 %    Platelet 239 130 - 400 x10(3)/mcL    MPV 10.5 (H) 7.4 - 10.4 fL    Neut % 58.4 %    Lymph % 26.1 %    Mono % 10.6 %    Eos % 4.2 %    Basophil % 0.5 %    Lymph # 1.11 0.6 - 4.6 x10(3)/mcL    Neut # 2.49 2.1 - 9.2 x10(3)/mcL    Mono # 0.45 0.1 - 1.3 x10(3)/mcL    Eos # 0.18 0 - 0.9 x10(3)/mcL    Baso # 0.02 <=0.2 x10(3)/mcL    IG# 0.01 0 - 0.04 x10(3)/mcL    IG% 0.2 %    NRBC% 0.0 %     Results for  orders placed or performed in visit on 06/19/23   Comprehensive Metabolic Panel   Result Value Ref Range    Sodium Level 141 136 - 145 mmol/L    Potassium Level 4.0 3.5 - 5.1 mmol/L    Chloride 109 (H) 98 - 107 mmol/L    Carbon Dioxide 26 22 - 29 mmol/L    Glucose Level 95 74 - 100 mg/dL    Blood Urea Nitrogen 10.4 9.8 - 20.1 mg/dL    Creatinine 0.76 0.55 - 1.02 mg/dL    Calcium Level Total 9.0 8.4 - 10.2 mg/dL    Protein Total 6.7 6.4 - 8.3 gm/dL    Albumin Level 3.6 3.5 - 5.0 g/dL    Globulin 3.1 2.4 - 3.5 gm/dL    Albumin/Globulin Ratio 1.2 1.1 - 2.0 ratio    Bilirubin Total 0.5 <=1.5 mg/dL    Alkaline Phosphatase 65 40 - 150 unit/L    Alanine Aminotransferase 8 0 - 55 unit/L    Aspartate Aminotransferase 15 5 - 34 unit/L    eGFR >60 mls/min/1.73/m2       Assessment:  Problem List Items Addressed This Visit    None    Squamous cell carcinoma of right tonsil, p16 positive:   (Diagnosed and treated at [facility] [place])   -Right tonsil biopsy (07/10/2019): Squamous cell carcinoma; HPV high risk cocktail detected   -cT2 cN1 M0 (stage I) or cT3 cN1 M0 (stage II)   -Treated with ipilimumab + nivolumab in combination with (concurrent) RT (on protocol) (phase 2, IMVAX trial for HPV positive squamous cell carcinoma of tonsil)   -Nivolumab 370 mg IV x2 cycles   -Ipilimumab 123 mg IV x2 cycles   -Treatment dates: 09/03/2019-05/26/2020    -Line of treatment: Neoadjuvant therapy   -Radiation therapy: 10/15/2019-11/27/2019   -No recurrence on PET/CT (02/04/2020)   -No disease recurrence (CT neck: 05/26/2020)   -As of 05/26/2020: Ageusia; early satiety; 90 lbs weight loss; off metformin (previous history of NIDDM; off Metformin after weight loss); persistent oral sensitivity; no adenopathy; generally, doing very well  -01/06/2021: CT soft tissue of the neck with contrast: No cervical lymphadenopathy  -01/06/2021: CT chest with contrast: No intrathoracic metastasis  -01/26/2021: ENT follow-up: Flexible laryngoscopy:  MARTHA; follow-up in 2 months  -01/06/2021:  Surveillance CT chest with contrast:  No metastases  -12/03/2021: Surveillance CT soft tissues of the neck with contrast (comparison:  CT neck 01/06/2021):  No recurrence or metastases  -01/16/2023: Surveillance CT soft tissues of the neck with contrast (CT neck 12/03/2021):  No recurrence or metastases  -06/19/2023: TSH normal  -06/19/2023:  CBC and CMP more less unremarkable and stable  -11/21/2023:  ENT follow-up doing well; no sore throat, difficulty swallowing, swelling in the neck, or hemoptysis; worsening xerostomia; had to discontinue pilocarpine due to lack of insurance coverage for the medication; no recurrent disease on physical exam and FFL exam  -ongoing neuropathy affecting hands and feet, but especially her hands; 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  Tingling and pain, for example, when trying to open a jar or bottle.  Has been taking gabapentin 1 tablet daily for several months.  Gabapentin is not helping.  Rises hard to swallow.  She has to cut a meet fine to be able to swallow.  No difficulty swallowing liquids.  No odynophagia.  Some acid reflux.  -on 06/21/2023, gabapentin dose increased to 600 mg p.o. q.h.s. for management of neuropathy bilateral hands and feet      Plan:  Does not need oncology follow-up.    Advised to continue to follow-up with ENT.  ------------------------      -01/16/2023:  No recurrence or metastases on surveillance CT neck  -11/21/2023: No recurrence on ENT follow-up/physical exam/FFL exam    Chemotherapy-induced neuropathy; continue gabapentin    She regularly follows up with ENT for surveillance; advised to continue Tha  From now on, follow-up with us only on PRN basis.    Above discussed at length with her.  All questions answered.  She understands and agrees with this plan.    Follow-up:  No follow-ups on file.

## 2024-03-11 ENCOUNTER — PATIENT OUTREACH (OUTPATIENT)
Dept: ADMINISTRATIVE | Facility: HOSPITAL | Age: 59
End: 2024-03-11
Payer: COMMERCIAL

## 2024-03-11 NOTE — LETTER
AUTHORIZATION FOR RELEASE OF   CONFIDENTIAL INFORMATION    Dear Dr Phipps,    We are seeing Jil Duque, date of birth 1965, in the clinic at No Department Specified. Anita Munoz MD is the patient's PCP. Jil Duque has an outstanding lab/procedure at the time we reviewed her chart. In order to help keep her health information updated, she has authorized us to request the following medical record(s):        ( X )  COLONOSCOPY/ ANY ASSOCIATED PATHOLOGY            ( X ) RECALL DATE            Please fax records to ZULEIKA Jarquin at [phone].     If you have any questions, please contact me at [phone].    Thank you,     ZULEIKA Jarquin,   Care Coordinator, [facility]              Patient Name: Jil Duque  : 1965  Patient Phone #: [phone]

## 2024-03-11 NOTE — PROGRESS NOTES
"Commercial Gap Report listing incorrect pcp attribution. Gap report showing GRANT Fall as PCP. This is an ENT provider. Attempt made to contact patient. No answer. Message left with name and phone number for callback.   Pt will need to notify insurance company of correct pcp.     Care Coordination Encounter Details:       MyChart Portal Status:         []  Reviewed MyChart Portal Status offered / enrolled if applicable        Additional Notes:     MyChart Outcomes: Pt is enrolled & active          Updates Requested / Reviewed:        Updated Care Coordination Note and Care Everywhere         Health Maintenance Screening(s) Due:      Health Maintenance Topics Overdue:      VBHM Score: 1     Mammogram                       Health Maintenance Topic(s) Outreach Outcomes & Actions Taken:    Breast Cancer Screening - Outreach Outcomes & Actions Taken  : No answer    Colorectal Cancer Screening - Outreach Outcomes & Actions Taken  : External Records Requested & Care Team Updated if Applicable       Additional Notes:  Record request for c-scope to [facility]- Dr Shayne Phipps           Chronic Disease Management:     Diabetes Measures      No results found for: "LABA1C", "HGBA1C"        []  Reviewed chart for active Diabetes diagnosis     []  Scheduled necessary follow up appointments if needed         Additional Notes:             Hypertension Measures        BP Readings from Last 1 Encounters:   12/14/23 129/83           []  Reviewed chart for active Hypertension diagnosis     []  Reviewed & documented Home BP Cuff     []  Documented a Remote BP if needed & applicable     []  Scheduled necessary follow up appointments with Primary Care if needed         Additional Notes:             Provider Team Continuity:     Last PCP Visit Date: Patient does not have a PCP or has not yet seen their PCP          []  Reviewed Primary Care Provider Visits, Annual Wellness Visit, and Future          Appointments to " ensure appointments have been scheduled and/or           completed        Additional Notes:             Social Determinants of Health          []  Reviewed, completed, and/or updated the following sections:                  Food Insecurity, Transportation Needs, Financial Resource Strain,                 Tobacco Use        Additional Notes:             Care Management, Digital Medicine, and/or Education Referrals    OPCM Risk Score: 23.7         Next Steps - Referral Actions: None        Additional Notes:

## 2024-03-18 PROBLEM — Z85.818 ENCOUNTER FOR FOLLOW-UP SURVEILLANCE OF TONSILLAR CANCER: Status: RESOLVED | Noted: 2023-12-14 | Resolved: 2024-03-18

## 2024-03-18 PROBLEM — Z08 ENCOUNTER FOR FOLLOW-UP SURVEILLANCE OF TONSILLAR CANCER: Status: RESOLVED | Noted: 2023-12-14 | Resolved: 2024-03-18

## 2024-04-01 NOTE — PROGRESS NOTES
Population Health Outreach.   The following record(s) have been received and uploaded for Health Maintenance.  History & Care team updated as applicable.    Colonoscopy 1/22/2018

## 2024-05-21 ENCOUNTER — OFFICE VISIT (OUTPATIENT)
Dept: OTOLARYNGOLOGY | Facility: CLINIC | Age: 59
End: 2024-05-21
Payer: COMMERCIAL

## 2024-05-21 VITALS — WEIGHT: 201.06 LBS | BODY MASS INDEX: 32.31 KG/M2 | HEIGHT: 66 IN

## 2024-05-21 DIAGNOSIS — Z08 ROUTINE CANCER FOLLOW-UP VISIT: Primary | ICD-10-CM

## 2024-05-21 DIAGNOSIS — Z85.818 PERSONAL HISTORY OF MALIGNANT NEOPLASM OF OTHER SITES OF LIP, ORAL CAVITY, AND PHARYNX: ICD-10-CM

## 2024-05-21 PROCEDURE — 99213 OFFICE O/P EST LOW 20 MIN: CPT | Mod: PBBFAC | Performed by: OTOLARYNGOLOGY

## 2024-05-21 RX ORDER — LIDOCAINE HYDROCHLORIDE 40 MG/ML
1 INJECTION, SOLUTION RETROBULBAR
Status: SHIPPED | OUTPATIENT
Start: 2024-05-21

## 2024-05-21 RX ORDER — LIDOCAINE HYDROCHLORIDE 40 MG/ML
SOLUTION TOPICAL
Status: DISPENSED
Start: 2024-05-21 | End: 2024-05-22

## 2024-05-21 NOTE — PROGRESS NOTES
The scope used for the exam was:  Flexible scope ENF-P4  Serial Number:  1)    [id number]    []   2)    [id number]    []   3)    [id number]    []   4)    [id number]    [x]   5)    [id number]    []   6)    [id number]    []       The scope used for the exam was:  Rigid scope   Serial Number:  1)   [id number]    []   2)   [id number]    []   3)   7330    []   4)    3384   []   5)    0824   []   6)    5554   []     7)   7425    []   8)   2240    []   9)   1109    []   The scope used for the exam was:  Flexible scope ENF-P4  Serial Number:  1)    [id number]    []   2)    [id number]    []   3)    [id number]    []   4)    [id number]    []   5)    [id number]    []   6)    [id number]    []       T  Answers submitted by the patient for this visit:  Review of Symptoms Questionnaire  (Submitted on 5/21/2024)  None of these: Yes  None of these: Yes  None of these : Yes  None of these: Yes  None of these : Yes  None of these: Yes  Urinating too frequently?: Yes  None of these : Yes  None of these: Yes  None of these : Yes  None of these: Yes  None of these: Yes  None of these: Yes

## 2024-05-22 NOTE — PROGRESS NOTES
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 She elects to go to [facility] where she received radiation therapy, had immunotherapy with proton beam radiation, which she completed in November 2019. She had post treatment scans including a PET scan and all were normal by her history. No notes from [facility] are available at this time, I reviewed the notes in the chart She was staged as a T3 N1 M0, She currently is experiencing some dysphagia, but this is gradually improving. She has lost a total of 90 pounds but is slowly gaining back. Her last CT scan was 8 months ago. She had been seen Dr. Oneal Bowden in [place], the notes in the chart reflect he did a fiberoptic endoscopy and was normal 3 months ago. She has since lost her job because of COVID and is now on Medicaid and was referred here for follow-up.    1/26/21: Returns to clinic today. No complaints or issues. No otalgia, weight loss, new dysphagia or odynophagia, changes in voice or breathing. Has a hard time eating meat due to longstanding dysphagia to it, but still able to get it down - feels it sometimes "gets stuck" but this is not new. Recent CT scans demonstrated no evidence of disease.    4/20/21: Doing well. Denies dysphagia, dysphonia, pain, wt loss, LAD. C/o recent nasal congestion and PND. Reports using flonase in the past which was helpful. Occasional nasal dryness. 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 Patient has no complaints of any sore throat, hemoptysis, or swelling in the neck. She does have periodic difficulty swallowing foods such as meats but " "otherwise has no dysphagia. She does admit to having problems with dry mouth since undergoing her treatment. She has been using Biotene but despite this she still has problems with dry mouth. This is worse at night.  The patient's oral intake does continue to improve. She had indicated that she does continue to gain some weight. She had had problems with altered sense of taste since her treatments but this is also improving though it is not back to its "normal" condition.  She does have some problems with chronic rhinitis and is currently using fluticasone nasal spray but does so on an every other day basis. She finds that it does help some. She has noticed that sometimes congestion may be worse at night and she is not sure if this may be related to her dryness of the mouth being worse at night. It also been given samples of a saline irrigation kit and she did find that that was also helpful with her nasal symptoms but she had not bought any refills.    9/21/21: Doing well overall. States she is having more uppercase PET scan\been eating more for pleasure and gaining weight. Denied any fatigue weakness myalgia. 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 States that she was doing better with her mucus and congestion when she was doing the dryness. Dr. Mcarthur gave her a last visit but has been out. Otherwise no other complaints    12/3/21: 12/8/20: Is a 55-year-old female who was seen by me in 2019 and private practice, a biopsy of the right tonsil was done and returned squamous cell carcinoma, HPV positive. [facility], had immunotherapy with proton beam radiation, which she completed in November 2019. She had post treatment scans including a PET scan and all were normal by her history. No notes from [facility] are available at this time, I reviewed the notes in the chart She was staged as a T3 N1 " "M0, She currently is experiencing some dysphagia, but this is gradually improving. She has lost a total of 90 pounds but is slowly gaining back. Her last CT scan was 8 months ago. She had been seen Dr. Oneal Bowden in [place], the notes in the chart reflect he did a fiberoptic endoscopy and was normal 3 months ago. She has since lost her job because of COVID and is now on Medicaid and was referred here for follow-up.    1/26/21: Returns to clinic today. No complaints or issues. No otalgia, weight loss, new dysphagia or odynophagia, changes in voice or breathing. Has a hard time eating meat due to longstanding dysphagia to it, but still able to get it down - feels it sometimes "gets stuck" but this is not new. Recent CT scans demonstrated no evidence of disease.    4/20/21: Doing well. Denies dysphagia, dysphonia, pain, wt loss, LAD. C/o recent nasal congestion and PND. Reports using flonase in the past which was helpful. Occasional nasal dryness. 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 Patient has no complaints of any sore throat, hemoptysis, or swelling in the neck. She does have periodic difficulty swallowing foods such as meats but otherwise has no dysphagia. She does admit to having problems with dry mouth since undergoing her treatment. She has been using Biotene but despite this she still has problems with dry mouth. This is worse at night.  The patient's oral intake does continue to improve. She had indicated that she does continue to gain some weight. She had had problems with altered sense of taste since her treatments but this is also improving though it is not back to its "normal" condition.  She does have some problems with chronic rhinitis and is currently using fluticasone nasal spray but does so on an every other day basis. She finds that it does help some. She has noticed that sometimes congestion may be " worse at night and she is not sure if this may be related to her dryness of the mouth being worse at night. It also been given samples of a saline irrigation kit and she did find that that was also helpful with her nasal symptoms but she had not bought any refills.    9/21/21: Doing well overall. States she is having more uppercase PET scan\been eating more for pleasure and gaining weight. Denied any fatigue weakness myalgia. 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 States that she was doing better with her mucus and congestion when she was doing the dryness. Dr. Mcarthur gave her a last visit but has been out. Otherwise no other complaints    12/3/21: Doing well overall. Appetite good, maintaining weight. Denied fatigue weakness myalgia. Still has some neuropathy States that she was doing better with her mucus and congestion when she was doing the dryness. The sialogen is helping for the xerostomia edition her taste is slowly coming back. Otherwise no other complaints. Last TSH was done in October and was normal. [1]    4/6/2022: Patient presenting in surveillance. At this time she notes she has some mucus pooling within her oropharynx and some minor levels of dysphagia however, she is continuing to maintain weight at this time. In fact she is gaining weight at this time. She denies any fatigue, weakness, myalgia, new onset pain in her neck or mouth. She does not have any lymphadenopathy that she has noticed. Additionally patient notes this year she has had increased rhinitis. She has not noticed this prior or is not specific to any seasons. She is doing nasal irrigations that is helping her. She notes her left nostril feels a little tender.     07/26/2022: Doing well per report. She denies any dysphagia apart from baseline to certain things following tx. Denies odynophagia, otalgia, LAD. She has been gaining weight. Salagen helps & she used it prn. She has been  using saline nasal rinses with help rhinitis/PND but has not tried flonase following NSI.    December 8, 2022: Patient presents today for follow-up of her squamous cell carcinoma of the tonsil.  She is doing well at this time.  She has no complaints of sore throat, difficulty swallowing, swelling in the neck, or changes in weight.  She does continue to use Salagen for her dry mouth and fluticasone nasal spray which does seem to help with rhinitis and nasal congestion.  She was seen by Dr. Galvez earlier today.  She had lab work done which included a CBC which was unremarkable as well as metabolic profile which was within normal limits except for a chloride of 109.  TSH level was not done. No new problems today.    6/19/23: Doing well. Denies any dysphagia, dysphonia, or type B symptoms. States salagen had been working well for xerostomia but her insurance stopped covering it.     November 21, 2023:   Patient presents today for follow-up of squamous cell carcinoma of the right tonsil.  Patient is doing well.  She has no complaints of sore throat, difficulty swallowing, swelling in the neck, or hemoptysis.  Only problem is that of some xerostomia which has worsened since she had to discontinue her pilocarpine due to lack of insurance coverage for the medication in the cost of the medication.  She is been using over-the-counter treatments and increasing her water intake which does seem to be helpful somewhat.  She has no other new problems today.    5/21/2024:   Patient is doing well.  No complaints of sore throat, difficulty swallowing, swelling in the neck, or hemoptysis.  She does continue to have problems with xerostomia and is currently treating this with saltwater gargles.  No other new problems today.    Past Medical History:  Past Medical History:   Diagnosis Date    Cancer July 2019    Radiation Oct 2019     Past Surgical History:   Procedure Laterality Date    COLONOSCOPY  01/22/2018    Son KENNA Phipps MD   "Repeat 4 years    EYE SURGERY  2001    HYSTERECTOMY      left foream      right heel         Social History:  Social History     Socioeconomic History    Marital status: Single   Tobacco Use    Smoking status: Never    Smokeless tobacco: Never   Substance and Sexual Activity    Alcohol use: Never    Drug use: Never    Sexual activity: Not Currently     Birth control/protection: Abstinence       Review of Systems:  Unremarkable except as mentioned above.    Current Medications:  Current Outpatient Medications   Medication Sig    gabapentin (NEURONTIN) 300 MG capsule Take 2 capsule daily at bedtime.    ketoconazole (NIZORAL) 2 % shampoo ketoconazole 2 % shampoo   SHAMPOO TWICE WEEKLY THEN RINSE    traMADoL (ULTRAM) 50 mg tablet tramadol 50 mg tablet    zinc sulfate (ZINCATE) 50 mg zinc (220 mg) capsule Take 220 mg by mouth.    clotrimazole-betamethasone 1-0.05% (LOTRISONE) cream clotrimazole-betamethasone 1 %-0.05 % topical cream (Patient not taking: Reported on 5/21/2024)    fluticasone propionate (FLONASE) 50 mcg/actuation nasal spray 2 sprays (100 mcg total) by Each Nostril route once daily. (Patient not taking: Reported on 6/19/2023)    olmesartan (BENICAR) 20 MG tablet Take 20 mg by mouth.     Current Facility-Administered Medications   Medication    LIDOcaine (PF) 40 mg/mL (4 %) injection 1 mL    phenylephrine HCL 1% nasal spray 1 spray        Allergies:  Review of patient's allergies indicates:  No Known Allergies     Family History:  Family History   Problem Relation Name Age of Onset    Diabetes Maternal Grandmother [place]     Hypertension Maternal Grandmother [place]     Osteoarthritis Maternal Grandmother Dara        Physical Exam:  Vital signs:   Vitals:    05/21/24 1524   Weight: 91.2 kg (201 lb 1 oz)   Height: 5' 6" (1.676 m)   General:  Well-developed well-nourished female in no acute distress.  Voice is normal.  Head and face:  Normocephalic.  No facial lesions.  No temporomandibular joint tenderness or " click.  Ears:  Right ear-auricle is normally developed.  External auditory canal is clear.  Tympanic membrane is nonerythematous.  No middle ear effusion.  Left ear-auricle is normally developed.  External auditory canal is clear.  Tympanic membrane is nonerythematous.  No middle ear effusion.  Nose:  Nasal dorsum is unremarkable.  No significant septal deviation.  No significant intranasal congestion.  Secretions are clear.  Oral cavity and oropharynx:  Tongue and floor mouth are without lesions.  Mucosa is moist.  No pharyngeal erythema or exudates.  No oropharyngeal masses.  No tonsillar hypertrophy.  No masses in the right tonsillar bed or the oropharynx.    Neck:  Supple without adenopathy or thyromegaly.  Trachea is in the midline.  Parotid and submandibular glands are normal to palpation without masses or tenderness.  Eyes:  Extraocular muscles intact.  No nystagmus.  No exophthalmos or enophthalmos.  Neurologic:  Alert and oriented.  Cranial nerves 2-12 are grossly normal.    Procedure note: Flexible laryngoscopy:   The nose was prepped with 1% phenyleprine nasal spray and tetracaine topically. The flexible fiberoptic laryngoscope was introduced into the left nostril and advanced. Examination of the nose showed the above mentioned findings. Examination of the nasopharynx showed no nasopharyngeal masses or eustachian tube obstruction. Examination of the base of tongue showed no masses or lingual tonsil hypertrophy. Laryngeal examination showed normal vocal cord mobility bilaterally.  No laryngeal masses. Examination of the hypopharynx showed no masses or pooling of secretions in the piriform sinuses.     Assessment/Plan:  T2N1M0 p16+ squamous cell carcinoma of the right tonsil treated with radiation therapy and immunotherapy at [facility] completed in November 2019.  No evident disease at this time.    Radiation induced xerostomia.    Plan:   Continue adequate oral intake of water for treatment of  her xerostomia.  Follow-up in 6 months with preclinic CT scan of the neck, chest x-ray, and TSH level.  Referral made to [facility] for establishment of a primary care provider and gynecologist..    HEAD & NECK CANCER SURVEILLANCE   Radiation and/or Chemotherapy     Medical Oncologist: Ryan (Last seen:  06/21/2023)  Radiation Oncologist: MD Broussard (Last seen: ?)    Primary tumor: T2N1M0, p16+ squamous cell carcinoma of right tonsil    Date of Diagnosis: 7/10/2019  Induction Chemotherapy: Yes; with ipilumamab &nivolumab  Therapy: See below  Completion Date: See below    Pertinent Treatment History:   Diagnosed with T2N1M0 p16+ squamous cell carcinoma of right tonsil on 7/10/2019; treated at [facility] with XRT with induction chemotherapy (ipilumamab & nivolumab) 10/15/2019-11/27/2019 & neoadjuvant therapy (ipilumamab & nivolumab) 9/13/2019-5/26/2020.    Place date if completed   3 6 12 18 24 36 48 60   CT Neck X X X 1/6/21 12/3/21 X 1/16/23 X   PET/CT X          CXR  X X 1/6/21 (CT) X X 6/19/23 X   TFT X  X  4/19/22 X 6/14/23 X     Current Surveillance Interval: 6 months           Jack Real M.D.

## 2024-09-24 ENCOUNTER — OFFICE VISIT (OUTPATIENT)
Dept: FAMILY MEDICINE | Facility: CLINIC | Age: 59
End: 2024-09-24
Payer: COMMERCIAL

## 2024-09-24 VITALS
HEIGHT: 66 IN | TEMPERATURE: 98 F | RESPIRATION RATE: 16 BRPM | WEIGHT: 208 LBS | BODY MASS INDEX: 33.43 KG/M2 | HEART RATE: 61 BPM | SYSTOLIC BLOOD PRESSURE: 145 MMHG | OXYGEN SATURATION: 100 % | DIASTOLIC BLOOD PRESSURE: 92 MMHG

## 2024-09-24 DIAGNOSIS — C09.9 SQUAMOUS CELL CARCINOMA OF RIGHT TONSIL: Primary | ICD-10-CM

## 2024-09-24 DIAGNOSIS — Z00.00 HEALTHCARE MAINTENANCE: ICD-10-CM

## 2024-09-24 DIAGNOSIS — Z11.4 ENCOUNTER FOR SCREENING FOR HIV: ICD-10-CM

## 2024-09-24 DIAGNOSIS — R03.0 ELEVATED BLOOD PRESSURE READING: ICD-10-CM

## 2024-09-24 DIAGNOSIS — Z11.59 ENCOUNTER FOR HEPATITIS C SCREENING TEST FOR LOW RISK PATIENT: ICD-10-CM

## 2024-09-24 DIAGNOSIS — Z13.220 SCREENING FOR HYPERLIPIDEMIA: ICD-10-CM

## 2024-09-24 DIAGNOSIS — Z12.12 SCREENING FOR COLORECTAL CANCER: ICD-10-CM

## 2024-09-24 DIAGNOSIS — Z12.31 BREAST CANCER SCREENING BY MAMMOGRAM: ICD-10-CM

## 2024-09-24 DIAGNOSIS — N32.81 OVERACTIVE BLADDER: ICD-10-CM

## 2024-09-24 DIAGNOSIS — Z12.11 SCREENING FOR COLORECTAL CANCER: ICD-10-CM

## 2024-09-24 LAB
BASOPHILS # BLD AUTO: 0.02 X10(3)/MCL
BASOPHILS NFR BLD AUTO: 0.6 %
CHOLEST SERPL-MCNC: 209 MG/DL
CHOLEST/HDLC SERPL: 3 {RATIO} (ref 0–5)
EOSINOPHIL # BLD AUTO: 0.16 X10(3)/MCL (ref 0–0.9)
EOSINOPHIL NFR BLD AUTO: 4.5 %
ERYTHROCYTE [DISTWIDTH] IN BLOOD BY AUTOMATED COUNT: 13.7 % (ref 11.5–17)
EST. AVERAGE GLUCOSE BLD GHB EST-MCNC: 114 MG/DL
HBA1C MFR BLD: 5.6 %
HCT VFR BLD AUTO: 40.1 % (ref 37–47)
HCV AB SERPL QL IA: NONREACTIVE
HDLC SERPL-MCNC: 76 MG/DL (ref 35–60)
HGB BLD-MCNC: 13.1 G/DL (ref 12–16)
HIV 1+2 AB+HIV1 P24 AG SERPL QL IA: NONREACTIVE
IMM GRANULOCYTES # BLD AUTO: 0 X10(3)/MCL (ref 0–0.04)
IMM GRANULOCYTES NFR BLD AUTO: 0 %
LDLC SERPL CALC-MCNC: 118 MG/DL (ref 50–140)
LYMPHOCYTES # BLD AUTO: 0.81 X10(3)/MCL (ref 0.6–4.6)
LYMPHOCYTES NFR BLD AUTO: 22.8 %
MCH RBC QN AUTO: 29.8 PG (ref 27–31)
MCHC RBC AUTO-ENTMCNC: 32.7 G/DL (ref 33–36)
MCV RBC AUTO: 91.1 FL (ref 80–94)
MONOCYTES # BLD AUTO: 0.27 X10(3)/MCL (ref 0.1–1.3)
MONOCYTES NFR BLD AUTO: 7.6 %
NEUTROPHILS # BLD AUTO: 2.3 X10(3)/MCL (ref 2.1–9.2)
NEUTROPHILS NFR BLD AUTO: 64.5 %
PLATELET # BLD AUTO: 258 X10(3)/MCL (ref 130–400)
PMV BLD AUTO: 11 FL (ref 7.4–10.4)
RBC # BLD AUTO: 4.4 X10(6)/MCL (ref 4.2–5.4)
TRIGL SERPL-MCNC: 73 MG/DL (ref 37–140)
TSH SERPL-ACNC: 1.45 UIU/ML (ref 0.35–4.94)
VLDLC SERPL CALC-MCNC: 15 MG/DL
WBC # BLD AUTO: 3.56 X10(3)/MCL (ref 4.5–11.5)

## 2024-09-24 PROCEDURE — 83036 HEMOGLOBIN GLYCOSYLATED A1C: CPT

## 2024-09-24 PROCEDURE — 80053 COMPREHEN METABOLIC PANEL: CPT

## 2024-09-24 PROCEDURE — 85025 COMPLETE CBC W/AUTO DIFF WBC: CPT

## 2024-09-24 PROCEDURE — 80061 LIPID PANEL: CPT

## 2024-09-24 PROCEDURE — 84443 ASSAY THYROID STIM HORMONE: CPT

## 2024-09-24 PROCEDURE — 87389 HIV-1 AG W/HIV-1&-2 AB AG IA: CPT

## 2024-09-24 PROCEDURE — 86803 HEPATITIS C AB TEST: CPT

## 2024-09-24 PROCEDURE — 36415 COLL VENOUS BLD VENIPUNCTURE: CPT

## 2024-09-24 RX ORDER — OXYBUTYNIN CHLORIDE 5 MG/1
5 TABLET, EXTENDED RELEASE ORAL DAILY
Qty: 30 TABLET | Refills: 11 | Status: SHIPPED | OUTPATIENT
Start: 2024-09-24 | End: 2025-09-24

## 2024-09-24 NOTE — ASSESSMENT & PLAN NOTE
Patient has a previous diagnosis of high blood pressure but was taken off of her medicine after weight loss.  Blood pressure is elevated in clinic today.  But was normal at a recent visit.  We will have her check her blood pressure at home and bring log to next visit.

## 2024-09-24 NOTE — ASSESSMENT & PLAN NOTE
Patient with urinary urgency and incontinence.  No stress incontinence symptoms.  She has already tried scheduled voids with minimal improvement of symptoms.  She is willing to try an anticholinergic.  She was made aware that this would likely worsen her dry mouth that she has from her previous radiation therapy.  We will try oxybutynin.

## 2024-09-24 NOTE — PROGRESS NOTES
[facility]  Iraida Barreto MD    Patient ID: [id number]     Chief Complaint: Establish Care (Has been over a year since last pcp)      HPI:     Jil Duque is a 58 y.o. female with a history of Stage I SCC of R tonsil, HTN, Type II DM, HLD here today to establish care.       She was diagnosed with T3 N1 M0 squamous cell carcinoma, HPV positive of the R tonsil in 2019 by Dr. Jack Real, ENT,  and completed radiation and immunotherapy at [facility] in [place].  Her last flexible laryngoscope for surveillance was in 2024 by Dr. Real, and it was clear. Surveillance CT neck and chest x-ray scheduled for November.     ENT: Jack Real MD  Oncologist: Mack Davis MD     Patient had a history of Type II DM and HTN, but has been off of medication following weight loss related to her cancer.     She reports urinary urgency and leakage for several years.  No leakage with coughing, Valsalva, sneezing.  She has tried scheduled voids, but this is difficult when she is at work.  She had a bladder suspension procedure .  She is not sure if her symptoms improved much after this procedure.  She had a hysterecyomy in  for abnormal uterine cells.  She was told there was no malignancy.  Patient is a , all SVDs. No alcohol or drug use.  Patient has had problems with dry mouth since her radiation therapy.    Past Medical History:   Diagnosis Date    HTN (hypertension)     Neuropathy     Obesity, unspecified     Overactive bladder     Radiation Oct 2019    Snoring     Tonsil cancer 2019        Past Surgical History:   Procedure Laterality Date    COLONOSCOPY  2018    Son KENNA MD [facility]  Repeat 4 years    EYE SURGERY      HYSTERECTOMY      left foream      right heel          Social History     Tobacco Use    Smoking status: Never    Smokeless tobacco: Never   Substance and Sexual Activity    Alcohol use: Never    Drug use: Never    Sexual activity: Not Currently     Birth  "control/protection: Abstinence        Current Outpatient Medications   Medication Instructions    clotrimazole-betamethasone 1-0.05% (LOTRISONE) cream clotrimazole-betamethasone 1 %-0.05 % topical cream    fluticasone propionate (FLONASE) 100 mcg, Each Nostril, Daily    gabapentin (NEURONTIN) 300 MG capsule Take 2 capsule daily at bedtime.    ketoconazole (NIZORAL) 2 % shampoo ketoconazole 2 % shampoo   SHAMPOO TWICE WEEKLY THEN RINSE    oxybutynin (DITROPAN-XL) 5 mg, Oral, Daily    traMADoL (ULTRAM) 50 mg, Oral, Every 4 hours PRN    zinc sulfate (ZINCATE) 220 mg       Review of patient's allergies indicates:  No Known Allergies     Patient Care Team:  Iraida Barreto MD as PCP - General (Family Medicine)  Jack Real MD as Consulting Physician (Otolaryngology)  Shayne Phipps MD as Consulting Physician (Gastroenterology)  Juan Antonio Saenz MD (Orthopedic Surgery)     Subjective:     Review of Systems    12 point review of systems conducted, negative except as stated in the history of present illness. See HPI for details.    Objective:     Visit Vitals  BP (!) 145/92 (BP Location: Right arm)   Pulse 61   Temp 98.1 °F (36.7 °C) (Oral)   Resp 16   Ht 5' 6" (1.676 m)   Wt 94.3 kg (208 lb)   SpO2 100%   BMI 33.57 kg/m²       Physical Exam  Vitals and nursing note reviewed.   Constitutional:       General: She is not in acute distress.     Appearance: She is not ill-appearing.   HENT:      Head: Normocephalic and atraumatic.      Mouth/Throat:      Mouth: Mucous membranes are moist.      Pharynx: Oropharynx is clear.   Eyes:      General: No scleral icterus.     Extraocular Movements: Extraocular movements intact.      Conjunctiva/sclera: Conjunctivae normal.      Pupils: Pupils are equal, round, and reactive to light.   Neck:      Vascular: No carotid bruit.   Cardiovascular:      Rate and Rhythm: Normal rate and regular rhythm.      Heart sounds: No murmur heard.     No friction rub. No gallop.   Pulmonary: " "     Effort: Pulmonary effort is normal. No respiratory distress.      Breath sounds: Normal breath sounds. No wheezing, rhonchi or rales.   Musculoskeletal:         General: Normal range of motion.      Cervical back: Normal range of motion and neck supple.      Right lower leg: No edema.      Left lower leg: No edema.   Skin:     General: Skin is warm and dry.   Neurological:      General: No focal deficit present.      Mental Status: She is alert.   Psychiatric:         Mood and Affect: Mood normal.         Labs Reviewed:     Chemistry:  Lab Results   Component Value Date     12/14/2023    K 4.1 12/14/2023    BUN 12.3 12/14/2023    CREATININE 0.83 12/14/2023    EGFRNORACEVR >60 12/14/2023    GLUCOSE 89 12/14/2023    CALCIUM 9.0 12/14/2023    ALKPHOS 60 12/14/2023    LABPROT 6.9 12/14/2023    ALBUMIN 3.7 12/14/2023    BILIDIR 0.2 04/19/2022    IBILI 0.30 04/19/2022    AST 20 12/14/2023    ALT 11 12/14/2023    HPHEQFEI88NN 33.5 12/09/2020    TSH 0.870 12/14/2023        No results found for: "HGBA1C", "MICROALBCREA"     Hematology:  Lab Results   Component Value Date    WBC 5.05 12/14/2023    HGB 12.3 12/14/2023    HCT 36.3 (L) 12/14/2023     12/14/2023       Lipid Panel:  No results found for: "CHOL", "HDL", "LDL", "TRIG", "TOTALCHOLEST"     Urine:  No results found for: "COLORUA", "APPEARANCEUA", "SGUA", "PHUA", "PROTEINUA", "GLUCOSEUA", "KETONESUA", "BLOODUA", "NITRITESUA", "LEUKOCYTESUR", "RBCUA", "WBCUA", "BACTERIA", "SQEPUA", "HYALINECASTS", "CREATRANDUR", "PROTEINURINE", "UPROTCREA"     Assessment:       ICD-10-CM ICD-9-CM   1. Squamous cell carcinoma of right tonsil  C09.9 146.0   2. Healthcare maintenance  Z00.00 V70.0   3. Breast cancer screening by mammogram  Z12.31 V76.12   4. Screening for colorectal cancer  Z12.11 V76.51    Z12.12 V76.41   5. Encounter for hepatitis C screening test for low risk patient  Z11.59 V73.89   6. Encounter for screening for HIV  Z11.4 V73.89   7. Screening for " hyperlipidemia  Z13.220 V77.91   8. Overactive bladder  N32.81 596.51   9. Elevated blood pressure reading  R03.0 796.2        Plan:     1. Squamous cell carcinoma of right tonsil  Assessment & Plan:  Patient completed radiation and immunotherapy in 2019. Continue regularly scheduled follow-up and surveillance with ENT.    Orders:  -     TSH    2. Healthcare maintenance  Overview:  Colon cancer screening: Colonoscopy 2018 - normal, follow-up 4 years, ordered today  Breast cancer screening: Mammogram due, ordered today  Cervical cancer screening: partial hysterectomy in 2007 for abnormal cells, no malignancy   Osteoporosis screening: n/a    Tetanus declined  Influenza declined  Pneumococcal declined   Shingles declined      Orders:  -     Mammo Digital Screening Bilat; Future; Expected date: 09/24/2024  -     Hemoglobin A1C  -     CBC Auto Differential  -     TSH  -     HIV 1/2 Ag/Ab (4th Gen)  -     Hepatitis C Antibody  -     Lipid Panel  -     Ambulatory referral/consult to Gastroenterology    3. Breast cancer screening by mammogram  -     Mammo Digital Screening Bilat; Future; Expected date: 09/24/2024    4. Screening for colorectal cancer  -     Ambulatory referral/consult to Gastroenterology    5. Encounter for hepatitis C screening test for low risk patient  -     Hepatitis C Antibody    6. Encounter for screening for HIV  -     HIV 1/2 Ag/Ab (4th Gen)    7. Screening for hyperlipidemia  -     Lipid Panel    8. Overactive bladder  Assessment & Plan:  Patient with urinary urgency and incontinence.  No stress incontinence symptoms.  She has already tried scheduled voids with minimal improvement of symptoms.  She is willing to try an anticholinergic.  She was made aware that this would likely worsen her dry mouth that she has from her previous radiation therapy.  We will try oxybutynin.    Orders:  -     oxybutynin (DITROPAN-XL) 5 MG TR24; Take 1 tablet (5 mg total) by mouth once daily.  Dispense: 30 tablet;  Refill: 11    9. Elevated blood pressure reading  Assessment & Plan:  Patient has a previous diagnosis of high blood pressure but was taken off of her medicine after weight loss.  Blood pressure is elevated in clinic today.  But was normal at a recent visit.  We will have her check her blood pressure at home and bring log to next visit.           Follow up in about 3 months (around 12/24/2024) for BP check. In addition to their scheduled follow up, the patient has also been instructed to follow up on as needed basis.     Future Appointments   Date Time Provider Department Center   10/8/2024  3:00 PM [facility] CT1 450 LB LIMIT [facility] CTSCN [facility]   11/14/2024  3:00 PM RESIDENT 2, [facility] OTORHINOLARYNGOLOGY [facility] ENT Tommy    12/17/2024  9:00 AM Iraida Barreto MD [facility]   12/30/2024  1:00 PM Natasha Boyce MD [facility] SERINA Sanders         Iraida Barreto MD

## 2024-09-24 NOTE — ASSESSMENT & PLAN NOTE
Patient completed radiation and immunotherapy in 2019. Continue regularly scheduled follow-up and surveillance with ENT.

## 2024-09-25 DIAGNOSIS — R03.0 ELEVATED BLOOD PRESSURE READING: Primary | ICD-10-CM

## 2024-09-25 DIAGNOSIS — C09.9 SQUAMOUS CELL CARCINOMA OF RIGHT TONSIL: ICD-10-CM

## 2024-09-25 DIAGNOSIS — Z00.00 HEALTHCARE MAINTENANCE: ICD-10-CM

## 2024-09-25 LAB
ALBUMIN SERPL-MCNC: 3.8 G/DL (ref 3.5–5)
ALBUMIN/GLOB SERPL: 1.2 RATIO (ref 1.1–2)
ALP SERPL-CCNC: 74 UNIT/L (ref 40–150)
ALT SERPL-CCNC: 10 UNIT/L (ref 0–55)
ANION GAP SERPL CALC-SCNC: 7 MEQ/L
AST SERPL-CCNC: 18 UNIT/L (ref 5–34)
BILIRUB SERPL-MCNC: 0.3 MG/DL
BUN SERPL-MCNC: 10.8 MG/DL (ref 9.8–20.1)
CALCIUM SERPL-MCNC: 9.3 MG/DL (ref 8.4–10.2)
CHLORIDE SERPL-SCNC: 109 MMOL/L (ref 98–107)
CO2 SERPL-SCNC: 26 MMOL/L (ref 22–29)
CREAT SERPL-MCNC: 0.85 MG/DL (ref 0.55–1.02)
CREAT/UREA NIT SERPL: 13
GFR SERPLBLD CREATININE-BSD FMLA CKD-EPI: >60 ML/MIN/1.73/M2
GLOBULIN SER-MCNC: 3.1 GM/DL (ref 2.4–3.5)
GLUCOSE SERPL-MCNC: 101 MG/DL (ref 74–100)
POTASSIUM SERPL-SCNC: 4.1 MMOL/L (ref 3.5–5.1)
PROT SERPL-MCNC: 6.9 GM/DL (ref 6.4–8.3)
SODIUM SERPL-SCNC: 142 MMOL/L (ref 136–145)

## 2024-10-09 ENCOUNTER — HOSPITAL ENCOUNTER (OUTPATIENT)
Dept: RADIOLOGY | Facility: HOSPITAL | Age: 59
Discharge: HOME | End: 2024-10-09
Attending: OTOLARYNGOLOGY
Payer: COMMERCIAL

## 2024-10-09 DIAGNOSIS — Z08 ROUTINE CANCER FOLLOW-UP VISIT: ICD-10-CM

## 2024-10-09 DIAGNOSIS — Z85.818 PERSONAL HISTORY OF MALIGNANT NEOPLASM OF OTHER SITES OF LIP, ORAL CAVITY, AND PHARYNX: ICD-10-CM

## 2024-10-09 PROCEDURE — 25500020 PHARM REV CODE 255: Performed by: OTOLARYNGOLOGY

## 2024-10-09 PROCEDURE — 70491 CT SOFT TISSUE NECK W/DYE: CPT | Mod: TC

## 2024-10-09 RX ADMIN — IOHEXOL 75 ML: 350 INJECTION, SOLUTION INTRAVENOUS at 02:10

## 2024-10-17 ENCOUNTER — HOSPITAL ENCOUNTER (OUTPATIENT)
Dept: RADIOLOGY | Facility: HOSPITAL | Age: 59
Discharge: HOME | End: 2024-10-17
Payer: COMMERCIAL

## 2024-10-17 DIAGNOSIS — Z00.00 HEALTHCARE MAINTENANCE: ICD-10-CM

## 2024-10-17 DIAGNOSIS — Z12.31 BREAST CANCER SCREENING BY MAMMOGRAM: ICD-10-CM

## 2024-10-17 PROCEDURE — 77067 SCR MAMMO BI INCL CAD: CPT | Mod: TC

## 2024-10-25 ENCOUNTER — OFFICE VISIT (OUTPATIENT)
Dept: FAMILY MEDICINE | Facility: CLINIC | Age: 59
End: 2024-10-25
Payer: COMMERCIAL

## 2024-10-25 VITALS
HEIGHT: 66 IN | RESPIRATION RATE: 17 BRPM | SYSTOLIC BLOOD PRESSURE: 170 MMHG | BODY MASS INDEX: 34.26 KG/M2 | WEIGHT: 213.19 LBS | OXYGEN SATURATION: 97 % | HEART RATE: 64 BPM | DIASTOLIC BLOOD PRESSURE: 106 MMHG | TEMPERATURE: 98 F

## 2024-10-25 DIAGNOSIS — T45.1X5A CHEMOTHERAPY-INDUCED NEUROPATHY: ICD-10-CM

## 2024-10-25 DIAGNOSIS — J01.90 ACUTE RHINOSINUSITIS: Primary | ICD-10-CM

## 2024-10-25 DIAGNOSIS — L21.9 SEBORRHEIC DERMATITIS: ICD-10-CM

## 2024-10-25 DIAGNOSIS — G62.0 CHEMOTHERAPY-INDUCED NEUROPATHY: ICD-10-CM

## 2024-10-25 DIAGNOSIS — I10 PRIMARY HYPERTENSION: ICD-10-CM

## 2024-10-25 RX ORDER — AMLODIPINE BESYLATE 5 MG/1
5 TABLET ORAL DAILY
Qty: 90 TABLET | Refills: 3 | Status: SHIPPED | OUTPATIENT
Start: 2024-10-25 | End: 2025-10-25

## 2024-10-25 RX ORDER — FLUTICASONE PROPIONATE 50 MCG
1 SPRAY, SUSPENSION (ML) NASAL DAILY
Qty: 18.2 ML | Refills: 2 | Status: SHIPPED | OUTPATIENT
Start: 2024-10-25

## 2024-10-25 RX ORDER — FLUOCINONIDE TOPICAL SOLUTION USP, 0.05% 0.5 MG/ML
1 SOLUTION TOPICAL EVERY OTHER DAY
Qty: 60 ML | Refills: 2 | Status: SHIPPED | OUTPATIENT
Start: 2024-10-25

## 2024-10-25 RX ORDER — GABAPENTIN 300 MG/1
CAPSULE ORAL
Qty: 60 CAPSULE | Refills: 2 | Status: SHIPPED | OUTPATIENT
Start: 2024-10-25

## 2024-10-25 RX ORDER — AMOXICILLIN AND CLAVULANATE POTASSIUM 875; 125 MG/1; MG/1
1 TABLET, FILM COATED ORAL EVERY 12 HOURS
Qty: 14 TABLET | Refills: 0 | Status: SHIPPED | OUTPATIENT
Start: 2024-10-25 | End: 2024-11-01

## 2024-10-25 RX ORDER — FLUOCINONIDE TOPICAL SOLUTION USP, 0.05% 0.5 MG/ML
1 SOLUTION TOPICAL EVERY OTHER DAY
COMMUNITY
End: 2024-10-25 | Stop reason: SDUPTHER

## 2024-10-25 RX ORDER — KETOCONAZOLE 20 MG/ML
SHAMPOO, SUSPENSION TOPICAL
Qty: 120 ML | Refills: 3 | Status: SHIPPED | OUTPATIENT
Start: 2024-10-28

## 2024-10-25 NOTE — ASSESSMENT & PLAN NOTE
Patient with paranasal and frontal sinus pressure and congestion for a little over 2 weeks.  We will get her back on her Flonase.  Patient was advised to use it twice a day for the next 2 weeks and then daily after.  We will also treat with Augmentin.  Continue sinus rinses as needed.

## 2024-10-25 NOTE — PROGRESS NOTES
[facility]  Iraida Barreto MD    Patient ID: [id number]     Chief Complaint: Nasal Congestion and Neck Pain (Right)      HPI:     Jil Duque is a 58 y.o. female with a history of Stage I SCC of R tonsil, HTN, Type II DM, HLD, and overactive bladder here today for acute illness.     She reports frontal and paranasal sinus pressure, thick mucus and congestion for 2 weeks.  She denied fevers, cough, shortness of breath.  She has a history of intermittent rhinosinusitis since her radiation therapy for her right tonsillar cancer.  CT of the neck for cancer surveillance on 10/09/2024 showed some paranasal sinus mucosal thickening.    Patient's blood pressures have been elevated at home since her last visit with me, systolic blood pressure ranging 1 .  Blood pressure 170/106 today, 150/95 on repeat.  She denied headaches, chest pain, shortness of breath, peripheral edema.    Patient was previously prescribed fluocinonide solution and ketoconazole shampoo for seborrheic dermatitis of the scalp.  She needs refills.  She reports itchy scalp currently with some flaking.    Past Medical History:   Diagnosis Date    HTN (hypertension)     Neuropathy     Obesity, unspecified     Overactive bladder     Primary hypertension 10/25/2024    Radiation Oct 2019    Snoring     Tonsil cancer 07/2019        Past Surgical History:   Procedure Laterality Date    COLONOSCOPY  01/22/2018    Son KENNA MD [facility]  Repeat 4 years    EYE SURGERY  2001    HYSTERECTOMY      left foream      right heel          Social History     Tobacco Use    Smoking status: Never    Smokeless tobacco: Never   Substance and Sexual Activity    Alcohol use: Never    Drug use: Never    Sexual activity: Not Currently     Birth control/protection: Abstinence        Current Outpatient Medications   Medication Instructions    amLODIPine (NORVASC) 5 mg, Oral, Daily    amoxicillin-clavulanate 875-125mg (AUGMENTIN) 875-125 mg per tablet 1 tablet, Oral,  "Every 12 hours    clotrimazole-betamethasone 1-0.05% (LOTRISONE) cream     fluocinonide (LIDEX) 0.05 % external solution 1 application , Topical (Top), Every other day    fluticasone propionate (FLONASE) 50 mcg, Each Nostril, Daily    gabapentin (NEURONTIN) 300 MG capsule Take 2 capsule daily at bedtime.    [START ON 10/28/2024] ketoconazole (NIZORAL) 2 % shampoo Topical (Top), Twice weekly    oxybutynin (DITROPAN-XL) 5 mg, Oral, Daily    traMADoL (ULTRAM) 50 mg, Every 4 hours PRN    zinc sulfate (ZINCATE) 220 mg       Review of patient's allergies indicates:  No Known Allergies     Patient Care Team:  Iraida Barreto MD as PCP - General (Family Medicine)  Jack Real MD as Consulting Physician (Otolaryngology)  Shayne Phipps MD as Consulting Physician (Gastroenterology)  Juan Antonio Saenz MD (Orthopedic Surgery)     Subjective:     Review of Systems   Constitutional:  Negative for activity change and unexpected weight change.   HENT:  Negative for hearing loss, rhinorrhea and trouble swallowing.    Eyes:  Negative for discharge and visual disturbance.   Respiratory:  Negative for chest tightness and wheezing.    Cardiovascular:  Negative for chest pain and palpitations.   Gastrointestinal:  Negative for blood in stool, constipation, diarrhea and vomiting.   Endocrine: Negative for polydipsia and polyuria.   Genitourinary:  Negative for difficulty urinating, dysuria, hematuria and menstrual problem.   Musculoskeletal:  Positive for arthralgias and neck pain. Negative for joint swelling.   Neurological:  Negative for weakness and headaches.   Psychiatric/Behavioral:  Positive for confusion. Negative for dysphoric mood.        12 point review of systems conducted, negative except as stated in the history of present illness. See HPI for details.    Objective:     Visit Vitals  BP (!) 170/106 (BP Location: Right arm, Patient Position: Sitting)   Pulse 64   Temp 98.3 °F (36.8 °C) (Oral)   Resp 17   Ht 5' 6" " (1.676 m)   Wt 96.7 kg (213 lb 3.2 oz)   SpO2 97%   BMI 34.41 kg/m²       Physical Exam  Vitals and nursing note reviewed.   Constitutional:       General: She is not in acute distress.     Appearance: She is obese. She is not ill-appearing.   HENT:      Head: Normocephalic and atraumatic.      Nose: Congestion present.      Mouth/Throat:      Mouth: Mucous membranes are moist.      Pharynx: Oropharynx is clear.   Eyes:      General: No scleral icterus.     Extraocular Movements: Extraocular movements intact.      Conjunctiva/sclera: Conjunctivae normal.   Neck:      Vascular: No carotid bruit.   Cardiovascular:      Rate and Rhythm: Normal rate.   Pulmonary:      Effort: Pulmonary effort is normal. No respiratory distress.      Breath sounds: Normal breath sounds.   Musculoskeletal:         General: Normal range of motion.      Cervical back: Normal range of motion and neck supple.      Right lower leg: No edema.      Left lower leg: No edema.   Skin:     General: Skin is warm and dry.   Neurological:      General: No focal deficit present.      Mental Status: She is alert.   Psychiatric:         Mood and Affect: Mood normal.         Labs Reviewed:     Chemistry:  Lab Results   Component Value Date     09/24/2024    K 4.1 09/24/2024    BUN 10.8 09/24/2024    CREATININE 0.85 09/24/2024    EGFRNORACEVR >60 09/24/2024    GLUCOSE 101 (H) 09/24/2024    CALCIUM 9.3 09/24/2024    ALKPHOS 74 09/24/2024    LABPROT 6.9 09/24/2024    ALBUMIN 3.8 09/24/2024    BILIDIR 0.2 04/19/2022    IBILI 0.30 04/19/2022    AST 18 09/24/2024    ALT 10 09/24/2024    KFTPVIXT43CG 33.5 12/09/2020    TSH 1.445 09/24/2024        Lab Results   Component Value Date    HGBA1C 5.6 09/24/2024        Hematology:  Lab Results   Component Value Date    WBC 3.56 (L) 09/24/2024    HGB 13.1 09/24/2024    HCT 40.1 09/24/2024     09/24/2024       Lipid Panel:  Lab Results   Component Value Date    CHOL 209 (H) 09/24/2024    HDL 76 (H) 09/24/2024  "   .00 09/24/2024    TRIG 73 09/24/2024    TOTALCHOLEST 3 09/24/2024        Urine:  No results found for: "COLORUA", "APPEARANCEUA", "SGUA", "PHUA", "PROTEINUA", "GLUCOSEUA", "KETONESUA", "BLOODUA", "NITRITESUA", "LEUKOCYTESUR", "RBCUA", "WBCUA", "BACTERIA", "SQEPUA", "HYALINECASTS", "CREATRANDUR", "PROTEINURINE", "UPROTCREA"     Assessment:       ICD-10-CM ICD-9-CM   1. Acute rhinosinusitis  J01.90 461.9   2. Primary hypertension  I10 401.9   3. Chemotherapy-induced neuropathy  G62.0 357.6    T45.1X5A E933.1   4. Seborrheic dermatitis  L21.9 690.10        Plan:     1. Acute rhinosinusitis  Assessment & Plan:  Patient with paranasal and frontal sinus pressure and congestion for a little over 2 weeks.  We will get her back on her Flonase.  Patient was advised to use it twice a day for the next 2 weeks and then daily after.  We will also treat with Augmentin.  Continue sinus rinses as needed.    Orders:  -     fluticasone propionate (FLONASE) 50 mcg/actuation nasal spray; 1 spray (50 mcg total) by Each Nostril route once daily.  Dispense: 18.2 mL; Refill: 2  -     amoxicillin-clavulanate 875-125mg (AUGMENTIN) 875-125 mg per tablet; Take 1 tablet by mouth every 12 (twelve) hours. for 7 days  Dispense: 14 tablet; Refill: 0    2. Primary hypertension  Overview:  Amlodipine 5 mg daily    Assessment & Plan:  Patient has had consistently elevated blood pressure readings at home and in clinic.  We will start amlodipine today.    AHA/ACC goal <130/80. JNC8 goal <140/90  Low Sodium Diet (DASH Diet - Less than 2 grams of sodium per day).  Monitor blood pressure daily and log. Report consistent numbers greater than 140/90.  Maintain healthy weight with goal BMI <30. Exercise 30 minutes per day, 5 days per week.      Orders:  -     amLODIPine (NORVASC) 5 MG tablet; Take 1 tablet (5 mg total) by mouth once daily.  Dispense: 90 tablet; Refill: 3    3. Chemotherapy-induced neuropathy  Overview:  Gabapentin 300 mg, 2 capsules " at bedtime    Assessment & Plan:  Patient has a history of stage I squamous cell carcinoma of the right tonsil, treated with radiation and immunotherapy.  Neuropathy use well-controlled with gabapentin.    Orders:  -     gabapentin (NEURONTIN) 300 MG capsule; Take 2 capsule daily at bedtime.  Dispense: 60 capsule; Refill: 2    4. Seborrheic dermatitis  Overview:  Ketoconazole 2% shampoo twice weekly  Fluocinonide 0.05% solution every other day    Assessment & Plan:  Symptoms previously well-controlled with above regimen.  She has been out of these medications for a while.  We will refill today    Orders:  -     fluocinonide (LIDEX) 0.05 % external solution; Apply 1 application  topically every other day.  Dispense: 60 mL; Refill: 2  -     ketoconazole (NIZORAL) 2 % shampoo; Apply topically twice a week.  Dispense: 120 mL; Refill: 3         No follow-ups on file. In addition to their scheduled follow up, the patient has also been instructed to follow up on as needed basis.     Future Appointments   Date Time Provider Department Center   11/14/2024  3:00 PM RESIDENT 2, [facility] OTORHINOLARYNGOLOGY [facility] ENT Tommy Colunga   12/17/2024  9:00 AM Iraida Barreto MD [facility]   12/30/2024  1:00 PM Natasha Boyce MD [facility] SERINA Barreto MD

## 2024-10-25 NOTE — ASSESSMENT & PLAN NOTE
Patient has a history of stage I squamous cell carcinoma of the right tonsil, treated with radiation and immunotherapy.  Neuropathy use well-controlled with gabapentin.

## 2024-11-08 ENCOUNTER — PATIENT OUTREACH (OUTPATIENT)
Facility: CLINIC | Age: 59
End: 2024-11-08
Payer: COMMERCIAL

## 2024-11-08 VITALS — DIASTOLIC BLOOD PRESSURE: 82 MMHG | SYSTOLIC BLOOD PRESSURE: 132 MMHG

## 2024-11-08 NOTE — PROGRESS NOTES
11/8/2024-Valuebased outreach done - success states needs to reschedule am appt to afternoon appt all done , no needs no med needs no sdoh needs contact # given for any needs Last portal use 10/29/2024 updated careeverywhere, immunizations, media and care coordination note

## 2024-11-14 ENCOUNTER — OFFICE VISIT (OUTPATIENT)
Dept: OTOLARYNGOLOGY | Facility: CLINIC | Age: 59
End: 2024-11-14
Payer: COMMERCIAL

## 2024-11-14 VITALS
DIASTOLIC BLOOD PRESSURE: 82 MMHG | SYSTOLIC BLOOD PRESSURE: 133 MMHG | RESPIRATION RATE: 20 BRPM | HEART RATE: 96 BPM | WEIGHT: 213.19 LBS | BODY MASS INDEX: 34.26 KG/M2 | HEIGHT: 66 IN | TEMPERATURE: 97 F | OXYGEN SATURATION: 99 %
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
## 2024-11-14 DIAGNOSIS — Z85.818 PERSONAL HISTORY OF MALIGNANT NEOPLASM OF OTHER SITES OF LIP, ORAL CAVITY, AND PHARYNX: ICD-10-CM

## 2024-11-14 DIAGNOSIS — Z08 ROUTINE CANCER FOLLOW-UP VISIT: Primary | ICD-10-CM

## 2024-11-14 DIAGNOSIS — K11.7 XEROSTOMIA DUE TO RADIOTHERAPY: ICD-10-CM

## 2024-11-14 DIAGNOSIS — C09.9 SQUAMOUS CELL CARCINOMA OF RIGHT TONSIL: ICD-10-CM

## 2024-11-14 PROCEDURE — 99214 OFFICE O/P EST MOD 30 MIN: CPT | Mod: PBBFAC

## 2024-11-14 NOTE — PROGRESS NOTES
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 She elects to go to [facility] where she received radiation therapy, had immunotherapy with proton beam radiation, which she completed in November 2019. She had post treatment scans including a PET scan and all were normal by her history. No notes from [facility] [place] are available at this time, I reviewed the notes in the chart She was staged as a T3 N1 M0, She currently is experiencing some dysphagia, but this is gradually improving. She has lost a total of 90 pounds but is slowly gaining back. Her last CT scan was 8 months ago. She had been seen Dr. Oneal Bowden in [place], the notes in the chart reflect he did a fiberoptic endoscopy and was normal 3 months ago. She has since lost her job because of COVID and is now on Medicaid and was referred here for follow-up.    1/26/21: Returns to clinic today. No complaints or issues. No otalgia, weight loss, new dysphagia or odynophagia, changes in voice or breathing. Has a hard time eating meat due to longstanding dysphagia to it, but still able to get it down - feels it sometimes "gets stuck" but this is not new. Recent CT scans demonstrated no evidence of disease.    4/20/21: Doing well. Denies dysphagia, dysphonia, pain, wt loss, LAD. C/o recent nasal congestion and PND. Reports using flonase in the past which was helpful. Occasional nasal dryness. 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 Patient has no complaints of any sore throat, hemoptysis, or swelling in the neck. She does have periodic difficulty swallowing foods such as meats but " "otherwise has no dysphagia. She does admit to having problems with dry mouth since undergoing her treatment. She has been using Biotene but despite this she still has problems with dry mouth. This is worse at night.  The patient's oral intake does continue to improve. She had indicated that she does continue to gain some weight. She had had problems with altered sense of taste since her treatments but this is also improving though it is not back to its "normal" condition.  She does have some problems with chronic rhinitis and is currently using fluticasone nasal spray but does so on an every other day basis. She finds that it does help some. She has noticed that sometimes congestion may be worse at night and she is not sure if this may be related to her dryness of the mouth being worse at night. It also been given samples of a saline irrigation kit and she did find that that was also helpful with her nasal symptoms but she had not bought any refills.    9/21/21: Doing well overall. States she is having more uppercase PET scan\been eating more for pleasure and gaining weight. Denied any fatigue weakness myalgia. 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 States that she was doing better with her mucus and congestion when she was doing the dryness. Dr. Mcarthur gave her a last visit but has been out. Otherwise no other complaints    12/3/21: 12/8/20: Is a 55-year-old female who was seen by me in 2019 and private practice, a biopsy of the right tonsil was done and returned squamous cell carcinoma, HPV positive. [facility], had immunotherapy with proton beam radiation, which she completed in November 2019. She had post treatment scans including a PET scan and all were normal by her history. No notes from [facility] are available at this time, I reviewed the notes in the chart She was staged as a T3 N1 " "M0, She currently is experiencing some dysphagia, but this is gradually improving. She has lost a total of 90 pounds but is slowly gaining back. Her last CT scan was 8 months ago. She had been seen Dr. Oneal Bowden in [place], the notes in the chart reflect he did a fiberoptic endoscopy and was normal 3 months ago. She has since lost her job because of COVID and is now on Medicaid and was referred here for follow-up.    1/26/21: Returns to clinic today. No complaints or issues. No otalgia, weight loss, new dysphagia or odynophagia, changes in voice or breathing. Has a hard time eating meat due to longstanding dysphagia to it, but still able to get it down - feels it sometimes "gets stuck" but this is not new. Recent CT scans demonstrated no evidence of disease.    4/20/21: Doing well. Denies dysphagia, dysphonia, pain, wt loss, LAD. C/o recent nasal congestion and PND. Reports using flonase in the past which was helpful. Occasional nasal dryness. 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 Patient has no complaints of any sore throat, hemoptysis, or swelling in the neck. She does have periodic difficulty swallowing foods such as meats but otherwise has no dysphagia. She does admit to having problems with dry mouth since undergoing her treatment. She has been using Biotene but despite this she still has problems with dry mouth. This is worse at night.  The patient's oral intake does continue to improve. She had indicated that she does continue to gain some weight. She had had problems with altered sense of taste since her treatments but this is also improving though it is not back to its "normal" condition.  She does have some problems with chronic rhinitis and is currently using fluticasone nasal spray but does so on an every other day basis. She finds that it does help some. She has noticed that sometimes congestion may be " worse at night and she is not sure if this may be related to her dryness of the mouth being worse at night. It also been given samples of a saline irrigation kit and she did find that that was also helpful with her nasal symptoms but she had not bought any refills.    9/21/21: Doing well overall. States she is having more uppercase PET scan\been eating more for pleasure and gaining weight. Denied any fatigue weakness myalgia. 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 States that she was doing better with her mucus and congestion when she was doing the dryness. Dr. Mcarthur gave her a last visit but has been out. Otherwise no other complaints    12/3/21: Doing well overall. Appetite good, maintaining weight. Denied fatigue weakness myalgia. Still has some neuropathy States that she was doing better with her mucus and congestion when she was doing the dryness. The sialogen is helping for the xerostomia edition her taste is slowly coming back. Otherwise no other complaints. Last TSH was done in October and was normal. [1]    4/6/2022: Patient presenting in surveillance. At this time she notes she has some mucus pooling within her oropharynx and some minor levels of dysphagia however, she is continuing to maintain weight at this time. In fact she is gaining weight at this time. She denies any fatigue, weakness, myalgia, new onset pain in her neck or mouth. She does not have any lymphadenopathy that she has noticed. Additionally patient notes this year she has had increased rhinitis. She has not noticed this prior or is not specific to any seasons. She is doing nasal irrigations that is helping her. She notes her left nostril feels a little tender.     07/26/2022: Doing well per report. She denies any dysphagia apart from baseline to certain things following tx. Denies odynophagia, otalgia, LAD. She has been gaining weight. Salagen helps & she used it prn. She has been  using saline nasal rinses with help rhinitis/PND but has not tried flonase following NSI.    December 8, 2022: Patient presents today for follow-up of her squamous cell carcinoma of the tonsil.  She is doing well at this time.  She has no complaints of sore throat, difficulty swallowing, swelling in the neck, or changes in weight.  She does continue to use Salagen for her dry mouth and fluticasone nasal spray which does seem to help with rhinitis and nasal congestion.  She was seen by Dr. Galvez earlier today.  She had lab work done which included a CBC which was unremarkable as well as metabolic profile which was within normal limits except for a chloride of 109.  TSH level was not done. No new problems today.    6/19/23: Doing well. Denies any dysphagia, dysphonia, or type B symptoms. States salagen had been working well for xerostomia but her insurance stopped covering it.     November 21, 2023:   Patient presents today for follow-up of squamous cell carcinoma of the right tonsil.  Patient is doing well.  She has no complaints of sore throat, difficulty swallowing, swelling in the neck, or hemoptysis.  Only problem is that of some xerostomia which has worsened since she had to discontinue her pilocarpine due to lack of insurance coverage for the medication in the cost of the medication.  She is been using over-the-counter treatments and increasing her water intake which does seem to be helpful somewhat.  She has no other new problems today.    5/21/2024:   Patient is doing well.  No complaints of sore throat, difficulty swallowing, swelling in the neck, or hemoptysis.  She does continue to have problems with xerostomia and is currently treating this with saltwater gargles.  No other new problems today.    11/14/24: Pt presents today for follow up.  She has been doing well.  She states that she is continuing to have dry mouth.  She reports she has recently started a bladder medicine that has been dry mouth worse  however she does not like to use Biotene her other medications and prefers drinking water to keep her mouth moist.  She denies any lumps or bumps or lesions.  She is eating appropriately and reports she has been gaining weight.    Past Medical History:  Past Medical History:   Diagnosis Date    HTN (hypertension)     Neuropathy     Obesity, unspecified     Overactive bladder     Primary hypertension 10/25/2024    Radiation Oct 2019    Snoring     Tonsil cancer 07/2019     Past Surgical History:   Procedure Laterality Date    COLONOSCOPY  01/22/2018    Son KENNA MD [facility]  Repeat 4 years    EYE SURGERY  2001    HYSTERECTOMY      left foream      right heel         Social History:  Social History     Socioeconomic History    Marital status: Single   Tobacco Use    Smoking status: Never    Smokeless tobacco: Never   Substance and Sexual Activity    Alcohol use: Never    Drug use: Never    Sexual activity: Not Currently     Birth control/protection: Abstinence     Social Drivers of Health     Financial Resource Strain: Low Risk  (11/8/2024)    Overall Financial Resource Strain (CARDIA)     Difficulty of Paying Living Expenses: Not hard at all   Food Insecurity: No Food Insecurity (11/8/2024)    Hunger Vital Sign     Worried About Running Out of Food in the Last Year: Never true     Ran Out of Food in the Last Year: Never true   Transportation Needs: No Transportation Needs (11/8/2024)    TRANSPORTATION NEEDS     Transportation : No   Physical Activity: Unknown (9/23/2024)    Exercise Vital Sign     Days of Exercise per Week: 0 days   Stress: No Stress Concern Present (9/23/2024)    [employer] - Occupational Stress Questionnaire     Feeling of Stress : Not at all   Housing Stability: Unknown (9/23/2024)    Housing Stability Vital Sign     Unable to Pay for Housing in the Last Year: No       Review of Systems:  Unremarkable except as mentioned above.    Current Medications:  Current Outpatient  "Medications   Medication Sig    amLODIPine (NORVASC) 5 MG tablet Take 1 tablet (5 mg total) by mouth once daily.    clotrimazole-betamethasone 1-0.05% (LOTRISONE) cream     fluocinonide (LIDEX) 0.05 % external solution Apply 1 application  topically every other day.    fluticasone propionate (FLONASE) 50 mcg/actuation nasal spray 1 spray (50 mcg total) by Each Nostril route once daily.    gabapentin (NEURONTIN) 300 MG capsule Take 2 capsule daily at bedtime.    ketoconazole (NIZORAL) 2 % shampoo Apply topically twice a week.    oxybutynin (DITROPAN-XL) 5 MG TR24 Take 1 tablet (5 mg total) by mouth once daily.    traMADoL (ULTRAM) 50 mg tablet Take 50 mg by mouth every 4 (four) hours as needed for Pain.    zinc sulfate (ZINCATE) 50 mg zinc (220 mg) capsule Take 220 mg by mouth.     Current Facility-Administered Medications   Medication    LIDOcaine (PF) 40 mg/mL (4 %) injection 1 mL    phenylephrine HCL 1% nasal spray 1 spray        Allergies:  Review of patient's allergies indicates:  No Known Allergies     Family History:  Family History   Problem Relation Name Age of Onset    Hypertension Mother      Heart failure Maternal Grandmother [place]     Diabetes Maternal Grandmother [place]     Hypertension Maternal Grandmother Dara     Osteoarthritis Maternal Grandmother [place]        Physical Exam:  Vital signs:   Vitals:    11/14/24 1414   BP: 133/82   BP Location: Right arm   Patient Position: Sitting   Pulse: 96   Resp: 20   Temp: 96.7 °F (35.9 °C)   TempSrc: Oral   SpO2: 99%   Weight: 96.7 kg (213 lb 3 oz)   Height: 5' 6" (1.676 m)   General:  Well-developed well-nourished female in no acute distress.  Voice is normal.  Head and face:  Normocephalic.  No facial lesions.  No temporomandibular joint tenderness or click.  Ears:  Right ear-auricle is normally developed.  External auditory canal is clear.  Tympanic membrane is nonerythematous.  No middle ear effusion.  Left ear-auricle is normally developed.  External " auditory canal is clear.  Tympanic membrane is nonerythematous.  No middle ear effusion.  Nose:  Nasal dorsum is unremarkable.  No significant septal deviation.  No significant intranasal congestion.  Secretions are clear.  Oral cavity and oropharynx:  Tongue and floor mouth are without lesions.  Mucosa is moist.  No pharyngeal erythema or exudates.  No oropharyngeal masses.  No tonsillar hypertrophy.  No masses in the right tonsillar bed or the oropharynx.    Neck:  Supple without adenopathy or thyromegaly.  Trachea is in the midline.  Parotid and submandibular glands are normal to palpation without masses or tenderness.  Eyes:  Extraocular muscles intact.  No nystagmus.  No exophthalmos or enophthalmos.  Neurologic:  Alert and oriented.  Cranial nerves 2-12 are grossly normal.    Procedure note: Flexible laryngoscopy:   The nose was prepped with 1% phenyleprine nasal spray and tetracaine topically. The flexible fiberoptic laryngoscope was introduced into the left nostril and advanced. Examination of the nose showed the above mentioned findings. Examination of the nasopharynx showed no nasopharyngeal masses or eustachian tube obstruction. Examination of the base of tongue showed no masses or lingual tonsil hypertrophy. Laryngeal examination showed normal vocal cord mobility bilaterally.  No laryngeal masses. Examination of the hypopharynx showed no masses or pooling of secretions in the piriform sinuses.     Imaging:  10/9/24 Ct Neck: Stable exam without significant interval change compared to 01/16/2023. MARTHA.    TSH 9/24/24: 1.445    Assessment/Plan:  T2N1M0 p16+ squamous cell carcinoma of the right tonsil treated with radiation therapy and immunotherapy at [facility] completed in November 2019 and May 2020.  No evident disease at this time.    Radiation induced xerostomia.    Plan:   Continue adequate oral intake of water for treatment of her xerostomia.  Follow-up in 6 months with chest Xray and can  extend visit to 1 year if clear      HEAD & NECK CANCER SURVEILLANCE   Radiation and/or Chemotherapy     Medical Oncologist: Ryan (Last seen:  06/21/2023)  Radiation Oncologist: MD Broussard (Last seen: ?)    Primary tumor: T2N1M0, p16+ squamous cell carcinoma of right tonsil    Date of Diagnosis: 7/10/2019  Induction Chemotherapy: Yes; with ipilumamab &nivolumab  Therapy: See below  Completion Date: See below    Pertinent Treatment History:   Diagnosed with T2N1M0 p16+ squamous cell carcinoma of right tonsil on 7/10/2019; treated at [facility] with XRT with induction chemotherapy (ipilumamab & nivolumab) 10/15/2019-11/27/2019 & neoadjuvant therapy (ipilumamab & nivolumab) 9/13/2019-5/26/2020.    Place date if completed   3 6 12 18 24 36 48 60   CT Neck X X X 1/6/21 12/3/21 X 1/16/23 10/9/24   PET/CT X          CXR  X X 1/6/21 (CT) X X 6/19/23 X   TFT X  X  4/19/22 X 6/14/23 9/24/24     Current Surveillance Interval: 6 months         Latonya Simpson MD  [employer] ENT PGY-3

## 2024-11-14 NOTE — PROGRESS NOTES
The scope used for the exam was:  Flexible scope ENF-P4  Serial Number:  1)    [id number]    []   2)    [id number]    [x]   3)    [id number]    []   4)    [id number]    []   5)    [id number]    []   6)    [id number]    []       The scope used for the exam was:  Rigid scope   Serial Number:  1)   [id number]    []   2)   [id number]    []   3)   [id number]    []   4)    3384   []   5)    0824   []   6)    5554   []     7)   7425    []   8)   2240    []   9)   1109    []

## 2024-12-10 ENCOUNTER — TELEPHONE (OUTPATIENT)
Dept: FAMILY MEDICINE | Facility: CLINIC | Age: 59
End: 2024-12-10
Payer: COMMERCIAL

## 2024-12-10 NOTE — TELEPHONE ENCOUNTER
1. Are there any outstanding tasks in the patient's chart? (Ex. Labs, MMG)?    2. Do we have any outstanding/Pending referrals?    3. Has the patient been seen in an ER, Urgent Care or been admitted to the hospital since last office visit with our office?    4. Has the patient seen any other health care provider (doctors) since last visit?    5. Has the patient had any blood work or x-rays done since last visit?    Sent mychart msg

## 2024-12-17 ENCOUNTER — OFFICE VISIT (OUTPATIENT)
Dept: FAMILY MEDICINE | Facility: CLINIC | Age: 59
End: 2024-12-17
Payer: COMMERCIAL

## 2024-12-17 VITALS
WEIGHT: 212.63 LBS | TEMPERATURE: 98 F | HEIGHT: 66 IN | BODY MASS INDEX: 34.17 KG/M2 | HEART RATE: 82 BPM | RESPIRATION RATE: 16 BRPM | OXYGEN SATURATION: 15 % | SYSTOLIC BLOOD PRESSURE: 140 MMHG | DIASTOLIC BLOOD PRESSURE: 96 MMHG

## 2024-12-17 DIAGNOSIS — M25.561 RECURRENT PAIN OF RIGHT KNEE: ICD-10-CM

## 2024-12-17 DIAGNOSIS — I10 PRIMARY HYPERTENSION: Primary | ICD-10-CM

## 2024-12-17 DIAGNOSIS — D72.819 LEUKOPENIA, UNSPECIFIED TYPE: ICD-10-CM

## 2024-12-17 PROCEDURE — 3044F HG A1C LEVEL LT 7.0%: CPT | Mod: CPTII,,,

## 2024-12-17 PROCEDURE — 3077F SYST BP >= 140 MM HG: CPT | Mod: CPTII,,,

## 2024-12-17 PROCEDURE — 1159F MED LIST DOCD IN RCRD: CPT | Mod: CPTII,,,

## 2024-12-17 PROCEDURE — 99214 OFFICE O/P EST MOD 30 MIN: CPT | Mod: ,,,

## 2024-12-17 PROCEDURE — 3080F DIAST BP >= 90 MM HG: CPT | Mod: CPTII,,,

## 2024-12-17 PROCEDURE — 1160F RVW MEDS BY RX/DR IN RCRD: CPT | Mod: CPTII,,,

## 2024-12-17 PROCEDURE — 3008F BODY MASS INDEX DOCD: CPT | Mod: CPTII,,,

## 2024-12-17 RX ORDER — OLMESARTAN MEDOXOMIL 20 MG/1
20 TABLET ORAL DAILY
Qty: 90 TABLET | Refills: 3 | Status: SHIPPED | OUTPATIENT
Start: 2024-12-17 | End: 2025-12-17

## 2024-12-17 NOTE — ASSESSMENT & PLAN NOTE
Patient with a history of right knee meniscal tear and arthritis.  We will obtain x-ray of the knee in plan for steroid injection in the next few weeks.

## 2024-12-17 NOTE — PROGRESS NOTES
[facility]  Iraida Barreto MD    Patient ID: [id number]     Chief Complaint: Follow-up (BP)      HPI:     Jil Duque is a 59 y.o. female here today for follow-up.    Hypertension  Blood pressure 138/80 in clinic today.  Blood pressure ranging 140s to 160s/80s to 90s at home. Amlodipine 5 mg started at last visit.  Patient reports swelling in her lower legs since starting the amlodipine.    Leukopenia  Patient with fluctuating leukopenia since 2020.    Patient reports daily pain in her right knee for years.  She has had injections in the right knee in the past which have helped with her pain.  She used to see Dr. Saenz before losing her insurance.        Past Medical History:   Diagnosis Date    HTN (hypertension)     Neuropathy     Obesity, unspecified     Overactive bladder     Primary hypertension 10/25/2024    Radiation Oct 2019    Snoring     Tonsil cancer 07/2019        Past Surgical History:   Procedure Laterality Date    COLONOSCOPY  01/22/2018    Son KENNA MD [facility]  Repeat 4 years    EYE SURGERY  2001    HYSTERECTOMY      left foream      right heel          Social History     Tobacco Use    Smoking status: Never    Smokeless tobacco: Never   Substance and Sexual Activity    Alcohol use: Never    Drug use: Never    Sexual activity: Not Currently     Birth control/protection: Abstinence        Current Outpatient Medications   Medication Instructions    clotrimazole-betamethasone 1-0.05% (LOTRISONE) cream     fluocinonide (LIDEX) 0.05 % external solution 1 application , Topical (Top), Every other day    fluticasone propionate (FLONASE) 50 mcg, Each Nostril, Daily    gabapentin (NEURONTIN) 300 MG capsule Take 2 capsule daily at bedtime.    ketoconazole (NIZORAL) 2 % shampoo Topical (Top), Twice weekly    olmesartan (BENICAR) 20 mg, Oral, Daily    oxybutynin (DITROPAN-XL) 5 mg, Oral, Daily    traMADoL (ULTRAM) 50 mg, Every 4 hours PRN       Review of patient's allergies indicates:  No Known  "Allergies     Patient Care Team:  Iraida Barreto MD as PCP - General (Family Medicine)  Jack Real MD as Consulting Physician (Otolaryngology)  Shayne Phipps MD as Consulting Physician (Gastroenterology)  Dany, Juan Antonio Alvarado MD (Orthopedic Surgery)  Jodie Gil LPN as Care Coordinator     Subjective:     Review of Systems    12 point review of systems conducted, negative except as stated in the history of present illness. See HPI for details.    Objective:     Visit Vitals  BP (!) 140/96   Pulse 82   Temp 98 °F (36.7 °C) (Oral)   Resp 16   Ht 5' 6" (1.676 m)   Wt 96.4 kg (212 lb 9.6 oz)   SpO2 (!) 15%   BMI 34.31 kg/m²       Physical Exam  Vitals and nursing note reviewed.   Constitutional:       General: She is not in acute distress.     Appearance: She is not ill-appearing.   HENT:      Head: Normocephalic and atraumatic.   Eyes:      General: No scleral icterus.     Extraocular Movements: Extraocular movements intact.      Conjunctiva/sclera: Conjunctivae normal.   Cardiovascular:      Rate and Rhythm: Normal rate and regular rhythm.      Heart sounds: No murmur heard.     No friction rub. No gallop.   Pulmonary:      Effort: Pulmonary effort is normal. No respiratory distress.      Breath sounds: Normal breath sounds. No wheezing, rhonchi or rales.   Musculoskeletal:         General: Normal range of motion.      Right lower leg: No edema.      Left lower leg: No edema.      Comments: Right knee swollen compared to left.  No tenderness to palpation of right knee.   Skin:     General: Skin is warm and dry.   Neurological:      General: No focal deficit present.      Mental Status: She is alert.   Psychiatric:         Mood and Affect: Mood normal.         Labs Reviewed:     Chemistry:  Lab Results   Component Value Date     09/24/2024    K 4.1 09/24/2024    BUN 10.8 09/24/2024    CREATININE 0.85 09/24/2024    EGFRNORACEVR >60 09/24/2024    GLUCOSE 101 (H) 09/24/2024    CALCIUM 9.3 09/24/2024    " "ALKPHOS 74 09/24/2024    LABPROT 6.9 09/24/2024    ALBUMIN 3.8 09/24/2024    BILIDIR 0.2 04/19/2022    IBILI 0.30 04/19/2022    AST 18 09/24/2024    ALT 10 09/24/2024    [id number] 33.5 12/09/2020    TSH 1.445 09/24/2024        Lab Results   Component Value Date    HGBA1C 5.6 09/24/2024        Hematology:  Lab Results   Component Value Date    WBC 3.56 (L) 09/24/2024    HGB 13.1 09/24/2024    HCT 40.1 09/24/2024     09/24/2024       Lipid Panel:  Lab Results   Component Value Date    CHOL 209 (H) 09/24/2024    HDL 76 (H) 09/24/2024    .00 09/24/2024    TRIG 73 09/24/2024    TOTALCHOLEST 3 09/24/2024        Urine:  No results found for: "COLORUA", "APPEARANCEUA", "SGUA", "PHUA", "PROTEINUA", "GLUCOSEUA", "KETONESUA", "BLOODUA", "NITRITESUA", "LEUKOCYTESUR", "RBCUA", "WBCUA", "BACTERIA", "SQEPUA", "HYALINECASTS", "CREATRANDUR", "PROTEINURINE", "UPROTCREA"     Assessment:       ICD-10-CM ICD-9-CM   1. Primary hypertension  I10 401.9   2. Recurrent pain of right knee  M25.561 719.46   3. Leukopenia, unspecified type  D72.819 288.50        Plan:     1. Primary hypertension  Overview:  Olmesartan 20 mg daily    Assessment & Plan:  Blood pressure still elevated at home.  We will discontinue amlodipine due to leg swelling.  Start olmesartan 20 mg daily.    AHA/ACC goal <130/80. JNC8 goal <140/90  Low Sodium Diet (DASH Diet - Less than 2 grams of sodium per day).  Monitor blood pressure daily and log. Report consistent numbers greater than 140/90.  Maintain healthy weight with goal BMI <30. Exercise 30 minutes per day, 5 days per week.      Orders:  -     olmesartan (BENICAR) 20 MG tablet; Take 1 tablet (20 mg total) by mouth once daily.  Dispense: 90 tablet; Refill: 3  -     Comprehensive Metabolic Panel; Future; Expected date: 01/17/2025  -     CBC Auto Differential; Future; Expected date: 01/17/2025    2. Recurrent pain of right knee  Assessment & Plan:  Patient with a history of right knee meniscal tear " and arthritis.  We will obtain x-ray of the knee in plan for steroid injection in the next few weeks.    Orders:  -     X-Ray Knee 3 View Right; Future; Expected date: 12/17/2024    3. Leukopenia, unspecified type  Assessment & Plan:  Patient with stable chronic leukopenia since treatment for her tonsillar cancer.  We will continue to monitor.  Repeat CBC in 1 month.    Orders:  -     CBC Auto Differential; Future; Expected date: 01/17/2025         Follow up in about 2 weeks (around 12/31/2024) for knee injection . In addition to their scheduled follow up, the patient has also been instructed to follow up on as needed basis.     Future Appointments   Date Time Provider Department Center   1/3/2025 10:00 AM Iraida Barreto MD [facility]   5/8/2025  3:30 PM RESIDENT 2, [facility] OTORHINOLARYNGOLOGY [facility] ENT Tommy Barreto MD

## 2024-12-17 NOTE — ASSESSMENT & PLAN NOTE
Patient with stable chronic leukopenia since treatment for her tonsillar cancer.  We will continue to monitor.  Repeat CBC in 1 month.

## 2024-12-20 ENCOUNTER — PATIENT OUTREACH (OUTPATIENT)
Facility: CLINIC | Age: 59
End: 2024-12-20
Payer: COMMERCIAL

## 2024-12-20 NOTE — PROGRESS NOTES
12/20/2024-Valuebased outreach done- Left vm with contact info and call back# Patient has f/u with Primary Care  1/3/2025

## 2024-12-31 ENCOUNTER — TELEPHONE (OUTPATIENT)
Dept: FAMILY MEDICINE | Facility: CLINIC | Age: 59
End: 2024-12-31
Payer: COMMERCIAL

## 2024-12-31 NOTE — TELEPHONE ENCOUNTER
1. Are there any outstanding tasks in the patient's chart? (Ex. Labs, MMG)?    2. Do we have any outstanding/Pending referrals?    3. Has the patient been seen in an ER, Urgent Care or been admitted to the hospital since last office visit with our office?    4. Has the patient seen any other health care provider (doctors) since last visit?    5. Has the patient had any blood work or x-rays done since last visit?      NA, VM left to return call to clinic. Left detailed VM she must have xray done of knee to get injection. Left details on how and where to do this.

## 2025-01-07 ENCOUNTER — DOCUMENTATION ONLY (OUTPATIENT)
Dept: FAMILY MEDICINE | Facility: CLINIC | Age: 60
End: 2025-01-07
Payer: COMMERCIAL

## 2025-01-08 ENCOUNTER — OFFICE VISIT (OUTPATIENT)
Dept: FAMILY MEDICINE | Facility: CLINIC | Age: 60
End: 2025-01-08
Payer: COMMERCIAL

## 2025-01-08 VITALS
OXYGEN SATURATION: 99 % | BODY MASS INDEX: 34.07 KG/M2 | HEIGHT: 66 IN | HEART RATE: 58 BPM | WEIGHT: 212 LBS | DIASTOLIC BLOOD PRESSURE: 92 MMHG | RESPIRATION RATE: 15 BRPM | TEMPERATURE: 98 F | SYSTOLIC BLOOD PRESSURE: 142 MMHG

## 2025-01-08 DIAGNOSIS — M17.11 PRIMARY OSTEOARTHRITIS OF RIGHT KNEE: Primary | ICD-10-CM

## 2025-01-08 PROCEDURE — 3080F DIAST BP >= 90 MM HG: CPT | Mod: CPTII,,,

## 2025-01-08 PROCEDURE — 3077F SYST BP >= 140 MM HG: CPT | Mod: CPTII,,,

## 2025-01-08 PROCEDURE — 1159F MED LIST DOCD IN RCRD: CPT | Mod: CPTII,,,

## 2025-01-08 PROCEDURE — 20610 DRAIN/INJ JOINT/BURSA W/O US: CPT | Mod: RT,,,

## 2025-01-08 PROCEDURE — 1160F RVW MEDS BY RX/DR IN RCRD: CPT | Mod: CPTII,,,

## 2025-01-08 PROCEDURE — 3008F BODY MASS INDEX DOCD: CPT | Mod: CPTII,,,

## 2025-01-08 PROCEDURE — 99213 OFFICE O/P EST LOW 20 MIN: CPT | Mod: 25,,,

## 2025-01-08 RX ORDER — LIDOCAINE HYDROCHLORIDE 10 MG/ML
4 INJECTION, SOLUTION INFILTRATION; PERINEURAL
Status: COMPLETED | OUTPATIENT
Start: 2025-01-08 | End: 2025-01-08

## 2025-01-08 RX ORDER — TRIAMCINOLONE ACETONIDE 40 MG/ML
40 INJECTION, SUSPENSION INTRA-ARTICULAR; INTRAMUSCULAR
Status: COMPLETED | OUTPATIENT
Start: 2025-01-08 | End: 2025-01-08

## 2025-01-08 RX ORDER — LIDOCAINE HYDROCHLORIDE 10 MG/ML
4 INJECTION, SOLUTION EPIDURAL; INFILTRATION; INTRACAUDAL; PERINEURAL
Status: DISCONTINUED | OUTPATIENT
Start: 2025-01-08 | End: 2025-01-08

## 2025-01-08 RX ADMIN — LIDOCAINE HYDROCHLORIDE 4 ML: 10 INJECTION, SOLUTION INFILTRATION; PERINEURAL at 03:01

## 2025-01-08 RX ADMIN — TRIAMCINOLONE ACETONIDE 40 MG: 40 INJECTION, SUSPENSION INTRA-ARTICULAR; INTRAMUSCULAR at 02:01

## 2025-01-08 NOTE — PROGRESS NOTES
[facility] Clinic  Iraida Barreto MD    Patient ID: [id number]     Chief Complaint: Knee injection      HPI:     Jil Duque is a 59 y.o. female here today for right knee injection.    She reports pain in the right knee for years.  She previously had injections in the knee by Dr. Saenz.  X-ray revealed mild-to-moderate arthritis with joint space narrowing is in the lateral compartment and tricompartmental osteophytes.  Patient is interested in a steroid injection of the knee today.    Past Medical History:   Diagnosis Date    HTN (hypertension)     Neuropathy     Obesity, unspecified     Overactive bladder     Primary hypertension 10/25/2024    Radiation Oct 2019    Snoring     Tonsil cancer 07/2019        Past Surgical History:   Procedure Laterality Date    COLONOSCOPY  01/22/2018    Shayne Phipps MD  Repeat 4 years    EYE SURGERY  2001    HYSTERECTOMY      left foream      right heel          Social History     Tobacco Use    Smoking status: Never    Smokeless tobacco: Never   Substance and Sexual Activity    Alcohol use: Never    Drug use: Never    Sexual activity: Not Currently     Birth control/protection: Abstinence        Current Outpatient Medications   Medication Instructions    clotrimazole-betamethasone 1-0.05% (LOTRISONE) cream     fluocinonide (LIDEX) 0.05 % external solution 1 application , Topical (Top), Every other day    fluticasone propionate (FLONASE) 50 mcg, Each Nostril, Daily    gabapentin (NEURONTIN) 300 MG capsule Take 2 capsule daily at bedtime.    ketoconazole (NIZORAL) 2 % shampoo Topical (Top), Twice weekly    olmesartan (BENICAR) 20 mg, Oral, Daily    oxybutynin (DITROPAN-XL) 5 mg, Oral, Daily    traMADoL (ULTRAM) 50 mg, Every 4 hours PRN       Review of patient's allergies indicates:  No Known Allergies     Patient Care Team:  Iraida Barreto MD as PCP - General (Family Medicine)  Jack Real MD as Consulting Physician (Otolaryngology)  Shayne Phipps MD as Consulting  "Physician (Gastroenterology)  Juan Antonio Saenz MD (Orthopedic Surgery)  Jodie Gil LPN as Care Coordinator     Subjective:     Review of Systems    12 point review of systems conducted, negative except as stated in the history of present illness. See HPI for details.    Objective:     Visit Vitals  BP (!) 142/92   Pulse (!) 58   Temp 97.6 °F (36.4 °C) (Oral)   Resp 15   Ht 5' 6" (1.676 m)   Wt 96.2 kg (212 lb)   SpO2 99%   BMI 34.22 kg/m²       Physical Exam  Constitutional:       General: She is not in acute distress.     Appearance: Normal appearance. She is not ill-appearing.   HENT:      Head: Normocephalic and atraumatic.      Mouth/Throat:      Mouth: Mucous membranes are moist.   Eyes:      Extraocular Movements: Extraocular movements intact.      Conjunctiva/sclera: Conjunctivae normal.   Cardiovascular:      Rate and Rhythm: Normal rate.   Pulmonary:      Effort: Pulmonary effort is normal. No respiratory distress.   Musculoskeletal:      Right lower leg: No edema.      Left lower leg: No edema.      Comments: Mild R knee effusion. No tenderness    Skin:     General: Skin is warm and dry.   Neurological:      General: No focal deficit present.      Mental Status: She is alert and oriented to person, place, and time. Mental status is at baseline.   Psychiatric:         Mood and Affect: Mood normal.         Behavior: Behavior normal.         Labs Reviewed:     Chemistry:  Lab Results   Component Value Date     09/24/2024    K 4.1 09/24/2024    BUN 10.8 09/24/2024    CREATININE 0.85 09/24/2024    EGFRNORACEVR >60 09/24/2024    GLUCOSE 101 (H) 09/24/2024    CALCIUM 9.3 09/24/2024    ALKPHOS 74 09/24/2024    LABPROT 6.9 09/24/2024    ALBUMIN 3.8 09/24/2024    BILIDIR 0.2 04/19/2022    IBILI 0.30 04/19/2022    AST 18 09/24/2024    ALT 10 09/24/2024    [id number] 33.5 12/09/2020    TSH 1.445 09/24/2024        Lab Results   Component Value Date    HGBA1C 5.6 09/24/2024        Hematology:  Lab " "Results   Component Value Date    WBC 3.56 (L) 09/24/2024    HGB 13.1 09/24/2024    HCT 40.1 09/24/2024     09/24/2024       Lipid Panel:  Lab Results   Component Value Date    CHOL 209 (H) 09/24/2024    HDL 76 (H) 09/24/2024    .00 09/24/2024    TRIG 73 09/24/2024    TOTALCHOLEST 3 09/24/2024        Urine:  No results found for: "COLORUA", "APPEARANCEUA", "SGUA", "PHUA", "PROTEINUA", "GLUCOSEUA", "KETONESUA", "BLOODUA", "NITRITESUA", "LEUKOCYTESUR", "RBCUA", "WBCUA", "BACTERIA", "SQEPUA", "HYALINECASTS", "CREATRANDUR", "PROTEINURINE", "UPROTCREA"     Right Knee Steroid Injection  Indication:  Osteoarthritis  Patient provided written consent for the procedure following discussion of risks and benefits.  The knee was palpated and the site of injection of the anterior lateral aspect of the right knee was marked.  The skin was cleaned with ChloraPrep.  Using a 1-1/2 inch 25 gauge needle on a 5 cc syringe, 4 cc of 1% lidocaine and 40 mg Kenalog was injected into the knee.  The patient tolerated the procedure well.      Assessment:       ICD-10-CM ICD-9-CM   1. Primary osteoarthritis of right knee  M17.11 715.16        Plan:     1. Primary osteoarthritis of right knee  -     triamcinolone acetonide injection 40 mg  -     LIDOcaine (PF) 10 mg/ml (1%) injection 40 mg         Follow up in about 3 months (around 4/8/2025). In addition to their scheduled follow up, the patient has also been instructed to follow up on as needed basis.     Future Appointments   Date Time Provider Department Center   4/14/2025  2:30 PM Iraida Barreto MD [facility]   5/8/2025  3:30 PM RESIDENT 2, [facility] OTORHINOLARYNGOLOGY [facility] ENT Tommy Barreto MD    "

## 2025-02-04 ENCOUNTER — PATIENT OUTREACH (OUTPATIENT)
Facility: CLINIC | Age: 60
End: 2025-02-04
Payer: COMMERCIAL

## 2025-02-04 NOTE — LETTER
AUTHORIZATION FOR RELEASE OF   CONFIDENTIAL INFORMATION        We are seeing Jil Duque, date of birth 1965, in the clinic at [facility]. Iraida Barreto MD is the patient's PCP. Jil Duque has an outstanding lab/procedure at the time we reviewed her chart. In order to help keep her health information updated, she has authorized us to request the following medical record:                        (  )  COLONOSCOPY                  (x) Pathology Report      Recommendation to repeat colonoscopy in ______ years             Please fax records to Iraida Barreto MD,  at [phone] or email to [email or web address].         Patient Name: Jil Duque  : 1965  Patient Phone #: [phone]                Jil Duque  MRN: [id number]  : 1965  Age: 58 y.o.  Sex: female         Patient/Legal Guardian Signature  This signature was collected at 2024           _______________________________   Printed Name/Relationship to Patient      Consent for Examination and Treatment: I hereby authorize the providers and employees of [facility] ([facility]) to provide medical treatment/services which includes, but is not limited to, performing and administering tests and diagnostic procedures that are deemed necessary, including, but not limited to, imaging examinations, blood tests and other laboratory procedures as may be required by the hospital, clinic, or may be ordered by my physician(s) or persons working under the general and/or special instructions of my physician(s).      I understand and agree that this consent covers all authorized persons, including but not limited to physicians, residents, nurse practitioners, physicians' assistants, specialists, consultants, student nurses, and independently contracted physicians, who are called upon by the physician in charge, to carry out the diagnostic procedures and medical or surgical treatment.     I hereby authorize  [facility] to retain or dispose of any specimens or tissue, should there be such remaining from any test or procedure.     I hereby authorize and give consent for [facility] providers and employees to take photographs, images or videotapes of such diagnostic, surgical or treatment procedures of Patient as may be required by [facility] or as may be ordered by a physician. I further acknowledge and agree that [facility] may use cameras or other devices for patient monitoring.     I am aware that the practice of medicine is not an exact science, and I acknowledge that no guarantees have been made to me as to the outcome of any tests, procedures or treatment.     Authorization for Release of Information: I understand that my insurance company and/or their agents may need information necessary to make determinations about payment/reimbursement. I hereby provide authorization to release to all insurance companies, their successors, assignees, other parties with whom they may have contracted, or others acting on their behalf, that are involved with payment for any hospital and/or clinic charges incurred by the patient, any information that they request and deem necessary for payment/reimbursement, and/or quality review.  I further authorize the release of my health information to physicians or other health care practitioners on staff who are involved in my health care now and in the future, and to other health care providers, entities, or institutions for the purpose of my continued care and treatment, including referrals.     REGISTRATION AUTHORIZATION  Form No. 42952 (Rev. 3/25/2024)    Page 1 of 3                       Medicare Patient's Certification and Authorization to Release Information and Payment Request:  I certify that the information given by me in applying for payment under Title XVIII of the Social Security Act is correct. I authorize any jimenez of medical or other information about me to release to the [facility], or its intermediaries or carriers, any information needed for this or a related Medicare claim. I request that payment of authorized benefits be made on my behalf.     Assignment of Insurance Benefits:   I hereby authorize any and all insurance companies, health plans, defined   benefit plans, health insurers or any entity that is or may be responsible for payment of my medical expenses to pay all hospital and medical benefits now due, and to become due and payable to me under any hospital benefits, sick benefits, injury benefits or any other benefit for services rendered to me, including Major Medical Benefits, direct to [facility] and all independently contracted physicians. I assign any and all rights that I may have against any and all insurance companies, health plans, defined benefit plans, health insurers or any entity that is or may be responsible for payment of my medical expenses, including, but not limited to any right to appeal a denial of a claim, any right to bring any action, lawsuit, administrative proceeding, or other cause of action on my behalf. I specifically assign my right to pursue litigation against any and all insurance companies, health plans, defined benefit plans, health insurers or any entity that is or may be responsible for payment of my medical expenses based upon a refusal to pay charges.            E. Valuables: It is understood and agreed that [facility] is not liable for the damage to or loss of any money, jewelry,   documents, dentures, eye glasses, hearing aids, prosthetics, or other property of value.     F. Computer Equipment: I understand and agree that should I choose to use computer equipment owned by [facility] or if I choose to access the Internet via [facility]s network, I do so at my own risk. [facility] is not responsible for any damage to my computer equipment or to any damages of any type that might arise from my loss of equipment or data.     G. Acceptance  of Financial Responsibility:  I agree that in consideration of the services and   supplies that have been   or will be furnished to the patient, I am hereby obligated to pay all charges made for or on the account of the patient according to the standard rates (in effect at the time the services and supplies are delivered) established by [facility], including its Patient Financial Assistance Policy to the extent it is applicable. I understand that I am responsible for all charges, or portions thereof, not covered by insurance or other sources. Patient refunds will be distributed only after balances at all [facility] facilities are paid.     H. Communication Authorization:  I hereby authorize [facility] and its representatives, along with any billing service   or  who may work on their behalf, to contact me on   my cell phone and/or home phone using pre- recorded messages, artificial voice messages, automatic telephone dialing devices or other computer assisted technology, or by electronic      mail, text messaging, or by any other form of electronic communication. This includes, but is not limited to, appointment reminders, yearly physical exam reminders, preventive care reminders, patient campaigns, welcome calls, and calls about account balances on my account or any account on which I am listed as a guarantor. I understand I have the right to opt out of these communications at any time.      Relationship  Between  Facility and  Provider:      I understand that some, but not all, providers furnishing services to the patient are not employees or agents of [facility]. The patient is under the care and supervision of his/her attending physician, and it is the responsibility of the facility and its nursing staff to carry out the instructions of such physicians. It is the responsibility of the patient's physician/designee to obtain the patient's informed consent, when required, for medical or surgical treatment,  special diagnostic or therapeutic procedures, or hospital services rendered for the patient under the special instructions of the physician/designee.           REGISTRATION AUTHORIZATION  Form No. 51555 (Rev. 3/25/2024)    Page 2 of 3                       Immunizations: [facility] shares immunization information with state sponsored health departments to help you and your doctor keep track of your immunization records. By signing, you consent to have this information shared with the health department in your state:                                [place] - LINKS ([place] Immunization Network for Kids Statewide)                                [place] - MIIX ([place] Immunization Information eXchange)                                [place] - ImmPRINT (Immunization Patient Registry with Integrated Technology)     TERM: This authorization is valid for this and subsequent care/treatment I receive at [facility] and will remain valid unless/until revoked in writing by me.     [facility]: As used in this document, [facility] means all [facility] owned and managed facilities, including, but not limited to, all health centers, surgery centers, clinics, urgent care centers, and hospitals.         [facility] complies with applicable Federal civil rights laws and does not discriminate on the basis of race, color, national origin, age, disability, or sex.  ATENCIÓN: si habla español, tiene a lopez disposición servicios gratuitos de asistencia lingüística. Fabián buckley [phone].  CHÚ Ý: N?u b?n nói Ti?ng Vi?t, có các d?ch v? h? tr? ngôn ng? mi?n phí dành cho b?n. G?i s? [phone].        REGISTRATION AUTHORIZATION  Form No. 80075 (Rev. 3/25/2024)   Page 3 of 3

## 2025-02-04 NOTE — PROGRESS NOTES
2/4/2025- Chart reviewed/Updated Value based outreach done- left vm with contact info and callback# [facility] Dr Shayne Phipps Re: Colonoscopy Pt has f/u 4/14/2025 with Primary care  metric due Colorectal cancer screening

## 2025-02-11 NOTE — PROGRESS NOTES
Colonoscopy previously hyper-linked 1/22/2018, no pathology taken. Follow up correspondence attached to encounter..

## 2025-04-07 ENCOUNTER — TELEPHONE (OUTPATIENT)
Dept: FAMILY MEDICINE | Facility: CLINIC | Age: 60
End: 2025-04-07
Payer: COMMERCIAL

## 2025-04-07 NOTE — TELEPHONE ENCOUNTER
1. Are there any outstanding tasks in the patient's chart? (Ex. Labs, MMG)?    2. Do we have any outstanding/Pending referrals?    3. Has the patient been seen in an ER, Urgent Care or been admitted to the hospital since last office visit with our office?    4. Has the patient seen any other health care provider (doctors) since last visit?    5. Has the patient had any blood work or x-rays done since last visit?      Left , sent HEAVEN message. Fax labs to [facility].

## 2025-04-17 ENCOUNTER — LAB VISIT (OUTPATIENT)
Dept: LAB | Facility: HOSPITAL | Age: 60
End: 2025-04-17
Payer: COMMERCIAL

## 2025-04-17 DIAGNOSIS — Z85.818 PERSONAL HISTORY OF MALIGNANT NEOPLASM OF OTHER SITES OF LIP, ORAL CAVITY, AND PHARYNX: ICD-10-CM

## 2025-04-17 DIAGNOSIS — I10 PRIMARY HYPERTENSION: ICD-10-CM

## 2025-04-17 DIAGNOSIS — D72.819 LEUKOPENIA, UNSPECIFIED TYPE: ICD-10-CM

## 2025-04-17 DIAGNOSIS — Z08 ROUTINE CANCER FOLLOW-UP VISIT: ICD-10-CM

## 2025-04-17 LAB
ALBUMIN SERPL-MCNC: 4.1 G/DL (ref 3.5–5)
ALBUMIN/GLOB SERPL: 1.2 RATIO (ref 1.1–2)
ALP SERPL-CCNC: 67 UNIT/L (ref 40–150)
ALT SERPL-CCNC: 14 UNIT/L (ref 0–55)
ANION GAP SERPL CALC-SCNC: 7 MEQ/L
AST SERPL-CCNC: 21 UNIT/L (ref 11–45)
BASOPHILS # BLD AUTO: 0.02 X10(3)/MCL
BASOPHILS NFR BLD AUTO: 0.4 %
BILIRUB SERPL-MCNC: 0.5 MG/DL
BUN SERPL-MCNC: 9.2 MG/DL (ref 9.8–20.1)
CALCIUM SERPL-MCNC: 9.1 MG/DL (ref 8.4–10.2)
CHLORIDE SERPL-SCNC: 106 MMOL/L (ref 98–107)
CO2 SERPL-SCNC: 25 MMOL/L (ref 22–29)
CREAT SERPL-MCNC: 0.87 MG/DL (ref 0.55–1.02)
CREAT/UREA NIT SERPL: 11
EOSINOPHIL # BLD AUTO: 0.12 X10(3)/MCL (ref 0–0.9)
EOSINOPHIL NFR BLD AUTO: 2.5 %
ERYTHROCYTE [DISTWIDTH] IN BLOOD BY AUTOMATED COUNT: 14 % (ref 11.5–17)
GFR SERPLBLD CREATININE-BSD FMLA CKD-EPI: >60 ML/MIN/1.73/M2
GLOBULIN SER-MCNC: 3.5 GM/DL (ref 2.4–3.5)
GLUCOSE SERPL-MCNC: 93 MG/DL (ref 74–100)
HCT VFR BLD AUTO: 39.3 % (ref 37–47)
HGB BLD-MCNC: 13.5 G/DL (ref 12–16)
IMM GRANULOCYTES # BLD AUTO: 0.01 X10(3)/MCL (ref 0–0.04)
IMM GRANULOCYTES NFR BLD AUTO: 0.2 %
LYMPHOCYTES # BLD AUTO: 1.17 X10(3)/MCL (ref 0.6–4.6)
LYMPHOCYTES NFR BLD AUTO: 24.2 %
MCH RBC QN AUTO: 30.5 PG (ref 27–31)
MCHC RBC AUTO-ENTMCNC: 34.4 G/DL (ref 33–36)
MCV RBC AUTO: 88.7 FL (ref 80–94)
MONOCYTES # BLD AUTO: 0.34 X10(3)/MCL (ref 0.1–1.3)
MONOCYTES NFR BLD AUTO: 7 %
NEUTROPHILS # BLD AUTO: 3.17 X10(3)/MCL (ref 2.1–9.2)
NEUTROPHILS NFR BLD AUTO: 65.7 %
NRBC BLD AUTO-RTO: 0 %
PLATELET # BLD AUTO: 276 X10(3)/MCL (ref 130–400)
PMV BLD AUTO: 10.3 FL (ref 7.4–10.4)
POTASSIUM SERPL-SCNC: 3.9 MMOL/L (ref 3.5–5.1)
PROT SERPL-MCNC: 7.6 GM/DL (ref 6.4–8.3)
RBC # BLD AUTO: 4.43 X10(6)/MCL (ref 4.2–5.4)
SODIUM SERPL-SCNC: 138 MMOL/L (ref 136–145)
TSH SERPL-ACNC: 1.18 UIU/ML (ref 0.35–4.94)
WBC # BLD AUTO: 4.83 X10(3)/MCL (ref 4.5–11.5)

## 2025-04-17 PROCEDURE — 36415 COLL VENOUS BLD VENIPUNCTURE: CPT

## 2025-04-17 PROCEDURE — 84443 ASSAY THYROID STIM HORMONE: CPT

## 2025-04-17 PROCEDURE — 85025 COMPLETE CBC W/AUTO DIFF WBC: CPT

## 2025-04-17 PROCEDURE — 80053 COMPREHEN METABOLIC PANEL: CPT

## 2025-04-21 ENCOUNTER — RESULTS FOLLOW-UP (OUTPATIENT)
Dept: FAMILY MEDICINE | Facility: CLINIC | Age: 60
End: 2025-04-21

## 2025-05-08 ENCOUNTER — OFFICE VISIT (OUTPATIENT)
Dept: OTOLARYNGOLOGY | Facility: CLINIC | Age: 60
End: 2025-05-08
Payer: COMMERCIAL

## 2025-05-08 ENCOUNTER — HOSPITAL ENCOUNTER (OUTPATIENT)
Dept: RADIOLOGY | Facility: HOSPITAL | Age: 60
Discharge: HOME | End: 2025-05-08
Attending: OTOLARYNGOLOGY
Payer: COMMERCIAL

## 2025-05-08 VITALS — DIASTOLIC BLOOD PRESSURE: 87 MMHG | TEMPERATURE: 98 F | SYSTOLIC BLOOD PRESSURE: 129 MMHG | HEART RATE: 57 BPM

## 2025-05-08 DIAGNOSIS — Z85.818 PERSONAL HISTORY OF MALIGNANT NEOPLASM OF OTHER SITES OF LIP, ORAL CAVITY, AND PHARYNX: ICD-10-CM

## 2025-05-08 DIAGNOSIS — Z08 ROUTINE CANCER FOLLOW-UP VISIT: ICD-10-CM
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
## 2025-05-08 DIAGNOSIS — C09.9 SQUAMOUS CELL CARCINOMA OF RIGHT TONSIL: Primary | ICD-10-CM

## 2025-05-08 DIAGNOSIS — K11.7 XEROSTOMIA DUE TO RADIOTHERAPY: ICD-10-CM

## 2025-05-08 PROCEDURE — 71046 X-RAY EXAM CHEST 2 VIEWS: CPT | Mod: TC

## 2025-05-08 PROCEDURE — 99213 OFFICE O/P EST LOW 20 MIN: CPT | Mod: PBBFAC,25 | Performed by: STUDENT IN AN ORGANIZED HEALTH CARE EDUCATION/TRAINING PROGRAM

## 2025-05-08 RX ORDER — PILOCARPINE HYDROCHLORIDE 5 MG/1
5 TABLET, FILM COATED ORAL 3 TIMES DAILY
Qty: 90 TABLET | Refills: 11 | Status: SHIPPED | OUTPATIENT
Start: 2025-05-08 | End: 2026-05-08

## 2025-05-08 NOTE — PROGRESS NOTES
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 She elects to go to [facility] where she received radiation therapy, had immunotherapy with proton beam radiation, which she completed in November 2019. She had post treatment scans including a PET scan and all were normal by her history. No notes from [facility], [place] are available at this time, I reviewed the notes in the chart She was staged as a T3 N1 M0, She currently is experiencing some dysphagia, but this is gradually improving. She has lost a total of 90 pounds but is slowly gaining back. Her last CT scan was 8 months ago. She had been seen Dr. Oneal Bowden in [place], the notes in the chart reflect he did a fiberoptic endoscopy and was normal 3 months ago. She has since lost her job because of COVID and is now on Medicaid and was referred here for follow-up.    1/26/21: Returns to clinic today. No complaints or issues. No otalgia, weight loss, new dysphagia or odynophagia, changes in voice or breathing. Has a hard time eating meat due to longstanding dysphagia to it, but still able to get it down - feels it sometimes "gets stuck" but this is not new. Recent CT scans demonstrated no evidence of disease.    4/20/21: Doing well. Denies dysphagia, dysphonia, pain, wt loss, LAD. C/o recent nasal congestion and PND. Reports using flonase in the past which was helpful. Occasional nasal dryness. 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 Patient has no complaints of any sore throat, hemoptysis, or swelling in the neck. She does have periodic difficulty swallowing foods such as meats but " "otherwise has no dysphagia. She does admit to having problems with dry mouth since undergoing her treatment. She has been using Biotene but despite this she still has problems with dry mouth. This is worse at night.  The patient's oral intake does continue to improve. She had indicated that she does continue to gain some weight. She had had problems with altered sense of taste since her treatments but this is also improving though it is not back to its "normal" condition.  She does have some problems with chronic rhinitis and is currently using fluticasone nasal spray but does so on an every other day basis. She finds that it does help some. She has noticed that sometimes congestion may be worse at night and she is not sure if this may be related to her dryness of the mouth being worse at night. It also been given samples of a saline irrigation kit and she did find that that was also helpful with her nasal symptoms but she had not bought any refills.    9/21/21: Doing well overall. States she is having more uppercase PET scan\been eating more for pleasure and gaining weight. Denied any fatigue weakness myalgia. 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 States that she was doing better with her mucus and congestion when she was doing the dryness. Dr. Mcarthur gave her a last visit but has been out. Otherwise no other complaints    12/3/21: 12/8/20: Is a 55-year-old female who was seen by me in 2019 and private practice, a biopsy of the right tonsil was done and returned squamous cell carcinoma, HPV positive. [facility], had immunotherapy with proton beam radiation, which she completed in November 2019. She had post treatment scans including a PET scan and all were normal by her history. No notes from [facility] are available at this time, I reviewed the notes in the chart She was staged as a T3 N1 " "M0, She currently is experiencing some dysphagia, but this is gradually improving. She has lost a total of 90 pounds but is slowly gaining back. Her last CT scan was 8 months ago. She had been seen Dr. Oneal Bowden in [place], the notes in the chart reflect he did a fiberoptic endoscopy and was normal 3 months ago. She has since lost her job because of COVID and is now on Medicaid and was referred here for follow-up.    1/26/21: Returns to clinic today. No complaints or issues. No otalgia, weight loss, new dysphagia or odynophagia, changes in voice or breathing. Has a hard time eating meat due to longstanding dysphagia to it, but still able to get it down - feels it sometimes "gets stuck" but this is not new. Recent CT scans demonstrated no evidence of disease.    4/20/21: Doing well. Denies dysphagia, dysphonia, pain, wt loss, LAD. C/o recent nasal congestion and PND. Reports using flonase in the past which was helpful. Occasional nasal dryness. 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 Patient has no complaints of any sore throat, hemoptysis, or swelling in the neck. She does have periodic difficulty swallowing foods such as meats but otherwise has no dysphagia. She does admit to having problems with dry mouth since undergoing her treatment. She has been using Biotene but despite this she still has problems with dry mouth. This is worse at night.  The patient's oral intake does continue to improve. She had indicated that she does continue to gain some weight. She had had problems with altered sense of taste since her treatments but this is also improving though it is not back to its "normal" condition.  She does have some problems with chronic rhinitis and is currently using fluticasone nasal spray but does so on an every other day basis. She finds that it does help some. She has noticed that sometimes congestion may be " worse at night and she is not sure if this may be related to her dryness of the mouth being worse at night. It also been given samples of a saline irrigation kit and she did find that that was also helpful with her nasal symptoms but she had not bought any refills.    9/21/21: Doing well overall. States she is having more uppercase PET scan\been eating more for pleasure and gaining weight. Denied any fatigue weakness myalgia. 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 States that she was doing better with her mucus and congestion when she was doing the dryness. Dr. Mcarthur gave her a last visit but has been out. Otherwise no other complaints    12/3/21: Doing well overall. Appetite good, maintaining weight. Denied fatigue weakness myalgia. Still has some neuropathy States that she was doing better with her mucus and congestion when she was doing the dryness. The sialogen is helping for the xerostomia edition her taste is slowly coming back. Otherwise no other complaints. Last TSH was done in October and was normal. [1]    4/6/2022: Patient presenting in surveillance. At this time she notes she has some mucus pooling within her oropharynx and some minor levels of dysphagia however, she is continuing to maintain weight at this time. In fact she is gaining weight at this time. She denies any fatigue, weakness, myalgia, new onset pain in her neck or mouth. She does not have any lymphadenopathy that she has noticed. Additionally patient notes this year she has had increased rhinitis. She has not noticed this prior or is not specific to any seasons. She is doing nasal irrigations that is helping her. She notes her left nostril feels a little tender.     07/26/2022: Doing well per report. She denies any dysphagia apart from baseline to certain things following tx. Denies odynophagia, otalgia, LAD. She has been gaining weight. Salagen helps & she used it prn. She has been  using saline nasal rinses with help rhinitis/PND but has not tried flonase following [facility]SI.    December 8, 2022: Patient presents today for follow-up of her squamous cell carcinoma of the tonsil.  She is doing well at this time.  She has no complaints of sore throat, difficulty swallowing, swelling in the neck, or changes in weight.  She does continue to use Salagen for her dry mouth and fluticasone nasal spray which does seem to help with rhinitis and nasal congestion.  She was seen by Dr. Galvez earlier today.  She had lab work done which included a CBC which was unremarkable as well as metabolic profile which was within normal limits except for a chloride of 109.  TSH level was not done. No new problems today.    6/19/23: Doing well. Denies any dysphagia, dysphonia, or type B symptoms. States salagen had been working well for xerostomia but her insurance stopped covering it.     November 21, 2023:   Patient presents today for follow-up of squamous cell carcinoma of the right tonsil.  Patient is doing well.  She has no complaints of sore throat, difficulty swallowing, swelling in the neck, or hemoptysis.  Only problem is that of some xerostomia which has worsened since she had to discontinue her pilocarpine due to lack of insurance coverage for the medication in the cost of the medication.  She is been using over-the-counter treatments and increasing her water intake which does seem to be helpful somewhat.  She has no other new problems today.    5/21/2024:   Patient is doing well.  No complaints of sore throat, difficulty swallowing, swelling in the neck, or hemoptysis.  She does continue to have problems with xerostomia and is currently treating this with saltwater gargles.  No other new problems today.    11/14/24: Pt presents today for follow up.  She has been doing well.  She states that she is continuing to have dry mouth.  She reports she has recently started a bladder medicine that has been dry mouth worse  however she does not like to use Biotene her other medications and prefers drinking water to keep her mouth moist.  She denies any lumps or bumps or lesions.  She is eating appropriately and reports she has been gaining weight.    5/8/2025:  Patient returns for routine surveillance.  Denies any new dysphagia odynophagia or otalgia.  Does have some stiffness of her neck also reports continued dry mouth she was doing medication but reports it got too expensive but she did say it was working for her.       Past Medical History:  Past Medical History:   Diagnosis Date    HTN (hypertension)     Neuropathy     Obesity, unspecified     Overactive bladder     Primary hypertension 10/25/2024    Radiation Oct 2019    Snoring     Tonsil cancer 07/2019     Past Surgical History:   Procedure Laterality Date    COLONOSCOPY  01/22/2018    Son KENNA MD [facility]  Repeat 4 years    EYE SURGERY  2001    HYSTERECTOMY      left foream      right heel         Social History:  Social History     Socioeconomic History    Marital status: Single   Tobacco Use    Smoking status: Never    Smokeless tobacco: Never   Substance and Sexual Activity    Alcohol use: Never    Drug use: Never    Sexual activity: Not Currently     Birth control/protection: Abstinence     Social Drivers of Health     Financial Resource Strain: Low Risk  (11/8/2024)    Overall Financial Resource Strain (CARDIA)     Difficulty of Paying Living Expenses: Not hard at all   Food Insecurity: No Food Insecurity (11/8/2024)    Hunger Vital Sign     Worried About Running Out of Food in the Last Year: Never true     Ran Out of Food in the Last Year: Never true   Transportation Needs: No Transportation Needs (11/8/2024)    TRANSPORTATION NEEDS     Transportation : No   Physical Activity: Unknown (9/23/2024)    Exercise Vital Sign     Days of Exercise per Week: 0 days   Stress: No Stress Concern Present (9/23/2024)    [employer] [place] [employer] - Occupational Stress  Questionnaire     Feeling of Stress : Not at all   Housing Stability: Unknown (9/23/2024)    Housing Stability Vital Sign     Unable to Pay for Housing in the Last Year: No       Review of Systems:  Unremarkable except as mentioned above.    Current Medications:  Current Outpatient Medications   Medication Sig    clotrimazole-betamethasone 1-0.05% (LOTRISONE) cream     fluticasone propionate (FLONASE) 50 mcg/actuation nasal spray 1 spray (50 mcg total) by Each Nostril route once daily. (Patient taking differently: 1 spray by Each Nostril route daily as needed.)    gabapentin (NEURONTIN) 300 MG capsule Take 2 capsule daily at bedtime. (Patient taking differently: daily as needed. Take 2 capsule daily at bedtime.)    ketoconazole (NIZORAL) 2 % shampoo Apply topically twice a week.    olmesartan (BENICAR) 20 MG tablet Take 1 tablet (20 mg total) by mouth once daily.    oxybutynin (DITROPAN-XL) 5 MG TR24 Take 1 tablet (5 mg total) by mouth once daily.    traMADoL (ULTRAM) 50 mg tablet Take 50 mg by mouth every 4 (four) hours as needed for Pain.    fluocinonide (LIDEX) 0.05 % external solution Apply 1 application  topically every other day. (Patient not taking: Reported on 5/8/2025)     Current Facility-Administered Medications   Medication    LIDOcaine (PF) 40 mg/mL (4 %) injection 1 mL    phenylephrine HCL 1% nasal spray 1 spray        Allergies:  Review of patient's allergies indicates:  No Known Allergies     Family History:  Family History   Problem Relation Name Age of Onset    Hypertension Mother      Heart failure Maternal Grandmother Dara     Diabetes Maternal Grandmother Lovejoy     Hypertension Maternal Grandmother Lovejoy     Osteoarthritis Maternal Grandmother Dara        Physical Exam:  Vital signs:   Vitals:    05/08/25 1500   BP: 129/87   BP Location: Left arm   Patient Position: Sitting   Pulse: (!) 57   Temp: 97.9 °F (36.6 °C)   TempSrc: Oral   General:  Well-developed well-nourished female in no acute  distress.  Voice is normal.  Head and face:  Normocephalic.  No facial lesions.  No temporomandibular joint tenderness or click.  Ears:  Right ear-auricle is normally developed.  External auditory canal is clear.  Tympanic membrane is nonerythematous.  No middle ear effusion.  Left ear-auricle is normally developed.  External auditory canal is clear.  Tympanic membrane is nonerythematous.  No middle ear effusion.  Nose:  Nasal dorsum is unremarkable.  No significant septal deviation.  No significant intranasal congestion.  Secretions are clear.  Oral cavity and oropharynx:  Tongue and floor mouth are without lesions.  Mucosa is moist.  No pharyngeal erythema or exudates.  No oropharyngeal masses.  No tonsillar hypertrophy.  No masses in the right tonsillar bed or the oropharynx.    Neck:  Supple without adenopathy or thyromegaly.  Trachea is in the midline.  Parotid and submandibular glands are normal to palpation without masses or tenderness.  Eyes:  Extraocular muscles intact.  No nystagmus.  No exophthalmos or enophthalmos.  Neurologic:  Alert and oriented.  Cranial nerves 2-12 are grossly normal.    Procedure note: Flexible laryngoscopy:   The nose was prepped with 1% phenyleprine nasal spray and tetracaine topically. The flexible fiberoptic laryngoscope was introduced into the left nostril and advanced. Examination of the nose showed the above mentioned findings. Examination of the nasopharynx showed no nasopharyngeal masses or eustachian tube obstruction. Examination of the base of tongue showed no masses or lingual tonsil hypertrophy. Laryngeal examination showed normal vocal cord mobility bilaterally.  No laryngeal masses. Examination of the hypopharynx showed no masses or pooling of secretions in the piriform sinuses.     Imaging:  10/9/24 Ct Neck: Stable exam without significant interval change compared to 01/16/2023. MARTHA.    4/17/2025: TSH WNL    Assessment/Plan:  T2N1M0 p16+ squamous cell carcinoma of  the right tonsil treated with radiation therapy and immunotherapy at [facility] completed in November 2019 and May 2020.  No evident disease at this time.    Radiation induced xerostomia.    Plan:   Continue adequate oral intake of water for treatment of her xerostomia- we will call in pilocarpine for patient discussed using GoodRx to make her prescription cheaper  Obtain x-ray and we will discuss results it TeleMed in 3 months  Neck exercises handout given  Return to clinic one year in-person for annual surveillance    HEAD & NECK CANCER SURVEILLANCE   Radiation and/or Chemotherapy     Medical Oncologist: Ryan (Last seen:  06/21/2023)  Radiation Oncologist: MD Broussard (Last seen: ?)    Primary tumor: T2N1M0, p16+ squamous cell carcinoma of right tonsil    Date of Diagnosis: 7/10/2019  Induction Chemotherapy: Yes; with ipilumamab &nivolumab  Therapy: See below  Completion Date: See below    Pertinent Treatment History:   Diagnosed with T2N1M0 p16+ squamous cell carcinoma of right tonsil on 7/10/2019; treated at [facility] with XRT with induction chemotherapy (ipilumamab & nivolumab) 10/15/2019-11/27/2019 & neoadjuvant therapy (ipilumamab & nivolumab) 9/13/2019-5/26/2020.    Place date if completed   3 6 12 18 24 36 48 60   CT Neck X X X 1/6/21 12/3/21 X 1/16/23 10/9/24   PET/CT X          CXR  X X 1/6/21 (CT) X X 6/19/23 X   TFT X  X  4/19/22 X 6/14/23 9/24/24     Current Surveillance Interval: 1 year           Answers submitted by the patient for this visit:  Review of Symptoms Questionnaire  (Submitted on 5/7/2025)  None of these: Yes  None of these: Yes  None of these : Yes  None of these: Yes  None of these : Yes  None of these: Yes  None of these: Yes  None of these : Yes  None of these: Yes  None of these : Yes  None of these: Yes  None of these: Yes  None of these: Yes

## 2025-05-08 NOTE — PROGRESS NOTES
The scope used for the exam was:  Flexible scope ENF-P4  Serial Number:  1)    [id number]    []   2)    [id number]    []   3)    [id number]    []   4)    [id number]    []   5)    [id number]    []   6)    [id number]    []     7)    [id number]     []     8)    [id number]     []     9)    [id number]     [x]    10)  [id number]      []       The scope used for the exam was:  Rigid scope   Serial Number:  1)   [id number]    []   2)   [id number]    []   3)   7330    []   4)    3384   []   5)    0824   []   6)    5554   []     7)   7425    []   8)   2240    []   9)   1109    []

## 2025-05-13 ENCOUNTER — OFFICE VISIT (OUTPATIENT)
Dept: FAMILY MEDICINE | Facility: CLINIC | Age: 60
End: 2025-05-13
Payer: COMMERCIAL

## 2025-05-13 VITALS
BODY MASS INDEX: 35.25 KG/M2 | OXYGEN SATURATION: 98 % | WEIGHT: 218.38 LBS | TEMPERATURE: 98 F | HEART RATE: 62 BPM | DIASTOLIC BLOOD PRESSURE: 87 MMHG | RESPIRATION RATE: 15 BRPM | SYSTOLIC BLOOD PRESSURE: 135 MMHG

## 2025-05-13 DIAGNOSIS — Z12.11 SCREENING FOR COLORECTAL CANCER: ICD-10-CM

## 2025-05-13 DIAGNOSIS — I10 PRIMARY HYPERTENSION: Primary | ICD-10-CM

## 2025-05-13 DIAGNOSIS — N32.81 OVERACTIVE BLADDER: ICD-10-CM

## 2025-05-13 DIAGNOSIS — Z12.12 SCREENING FOR COLORECTAL CANCER: ICD-10-CM

## 2025-05-13 DIAGNOSIS — M17.11 PRIMARY OSTEOARTHRITIS OF RIGHT KNEE: ICD-10-CM

## 2025-05-13 PROCEDURE — 3075F SYST BP GE 130 - 139MM HG: CPT | Mod: CPTII,,,

## 2025-05-13 PROCEDURE — 1160F RVW MEDS BY RX/DR IN RCRD: CPT | Mod: CPTII,,,

## 2025-05-13 PROCEDURE — 4010F ACE/ARB THERAPY RXD/TAKEN: CPT | Mod: CPTII,,,

## 2025-05-13 PROCEDURE — 99214 OFFICE O/P EST MOD 30 MIN: CPT | Mod: ,,,

## 2025-05-13 PROCEDURE — 3079F DIAST BP 80-89 MM HG: CPT | Mod: CPTII,,,

## 2025-05-13 PROCEDURE — 1159F MED LIST DOCD IN RCRD: CPT | Mod: CPTII,,,

## 2025-05-13 PROCEDURE — 3008F BODY MASS INDEX DOCD: CPT | Mod: CPTII,,,

## 2025-05-13 RX ORDER — MELOXICAM 7.5 MG/1
7.5 TABLET ORAL DAILY
Qty: 30 TABLET | Refills: 2 | Status: SHIPPED | OUTPATIENT
Start: 2025-05-13 | End: 2025-05-14 | Stop reason: SDUPTHER

## 2025-05-13 RX ORDER — LIDOCAINE 50 MG/G
1 PATCH TOPICAL DAILY
Qty: 14 PATCH | Refills: 2 | Status: SHIPPED | OUTPATIENT
Start: 2025-05-13

## 2025-05-13 NOTE — ASSESSMENT & PLAN NOTE
Blood pressure at goal.  Continue above medication  Patient had peripheral edema with amlodipine    AHA/ACC goal <130/80. JNC8 goal <140/90  Low Sodium Diet (DASH Diet - Less than 2 grams of sodium per day).  Monitor blood pressure daily and log. Report consistent numbers greater than 140/90.  Maintain healthy weight with goal BMI <30. Exercise 30 minutes per day, 5 days per week.

## 2025-05-13 NOTE — LETTER
Fax Transmission                                                                                                                                                       Date: May 13, 2025       To:  Joel troncoso From: [facility]    Fax:   Fax: [phone]    Phone:   Phone: [phone]      Special Instructions:     For questions or issues, please contact department listed on attached report.          Please contact patient to set up new patient appointment. Patient is aware to look out for call.                  IF THERE ARE ANY PROBLEMS WITH THIS TRANSMISSION, PLEASE CALL IMMEDIATELY. THANK YOU    CONFIDENTIALITY NOTICE: The accompanying facsimile is intended solely for the use of the recipient designated above. Document(s) transmitted herewith may contain information that is confidential and privileged. Delivery, distribution of dissemination of this communication other than to the intended recipient is strictly prohibited. If you are another healthcare provider and have received this facsimile in error, please properly dispose and notify the sender. If you are NOT a healthcare provider and have received this facsimile in error, please notify [facility]s Compliance & Privacy Department immediately by email at [email or web address]

## 2025-05-13 NOTE — PROGRESS NOTES
[facility]  Iraida Barreto MD    Patient ID: [id number]     Chief Complaint: Follow-up      HPI:     Jil Duque is a 59 y.o. female with history of Stage I SCC of R tonsil, Type II DM (resolved with weight loss), HLD, and overactive bladder, chronic leukopenia, bilateral knee osteoarthritis here today for hypertension follow-up.    Blood pressure 135/87 in clinic today.  She is compliant with olmesartan 20 mg daily.    Last visit, patient had a steroid injection in her knee for osteoarthritis.  She had some improvement in her pain for about 1 month.  She is currently not using anything for her pain.  No improvement with topical Voltaren in the past.    Urge incontinence improved with oxybutynin, but she still has bladder spasms a few times per week.  She has chronic dry mouth from radiation therapy, and is not sure the oxybutynin has worsen this.  ENT prescribe her pilocarpine for dry mouth recently, but she has not tried it yet.     Referred to [facility] last visit for colon cancer screening, but patient reports they did not schedule her yet.    Past Medical History:   Diagnosis Date    HTN (hypertension)     Neuropathy     Obesity, unspecified     Overactive bladder     Primary hypertension 10/25/2024    Radiation Oct 2019    Snoring     Tonsil cancer 07/2019        Past Surgical History:   Procedure Laterality Date    COLONOSCOPY  01/22/2018    Son KENNA Phipps MD  Repeat 4 years    EYE SURGERY  2001    HYSTERECTOMY      left foream      right heel          Social History     Tobacco Use    Smoking status: Never    Smokeless tobacco: Never   Substance and Sexual Activity    Alcohol use: Never    Drug use: Never    Sexual activity: Not Currently     Birth control/protection: Abstinence        Current Outpatient Medications   Medication Instructions    clotrimazole-betamethasone 1-0.05% (LOTRISONE) cream     fluocinonide (LIDEX) 0.05 % external solution 1 application , Topical (Top), Every  other day    fluticasone propionate (FLONASE) 50 mcg, Each Nostril, Daily    gabapentin (NEURONTIN) 300 MG capsule Take 2 capsule daily at bedtime.    ketoconazole (NIZORAL) 2 % shampoo Topical (Top), Twice weekly    LIDOcaine (LIDODERM) 5 % 1 patch, Transdermal, Daily, Remove & Discard patch within 12 hours or as directed by MD    meloxicam (MOBIC) 7.5 mg, Oral, Daily    olmesartan (BENICAR) 20 mg, Oral, Daily    oxybutynin (DITROPAN-XL) 5 mg, Oral, Daily    pilocarpine (SALAGEN) 5 mg, Oral, 3 times daily    traMADoL (ULTRAM) 50 mg, Every 4 hours PRN       Review of patient's allergies indicates:  No Known Allergies     Patient Care Team:  Iraida Barreto MD as PCP - General (Family Medicine)  Jack Real MD as Consulting Physician (Otolaryngology)  Shayne Phipps MD as Consulting Physician (Gastroenterology)  Dany, Juan Antonio Alvarado MD (Orthopedic Surgery)  Jodie Gil LPN as Care Coordinator     Subjective:     Review of Systems    12 point review of systems conducted, negative except as stated in the history of present illness. See HPI for details.    Objective:     Visit Vitals  /87 (Patient Position: Sitting)   Pulse 62   Temp 98.1 °F (36.7 °C) (Oral)   Resp 15   Wt 99.1 kg (218 lb 6.4 oz)   SpO2 98%   BMI 35.25 kg/m²       Physical Exam  Vitals and nursing note reviewed.   Constitutional:       General: She is not in acute distress.     Appearance: She is not ill-appearing.   HENT:      Head: Normocephalic and atraumatic.   Eyes:      General: No scleral icterus.     Extraocular Movements: Extraocular movements intact.      Conjunctiva/sclera: Conjunctivae normal.   Cardiovascular:      Rate and Rhythm: Normal rate and regular rhythm.      Heart sounds: No murmur heard.     No friction rub. No gallop.   Pulmonary:      Effort: Pulmonary effort is normal. No respiratory distress.      Breath sounds: Normal breath sounds. No wheezing, rhonchi or rales.   Musculoskeletal:         General: Normal  "range of motion.      Right lower leg: No edema.      Left lower leg: No edema.   Skin:     General: Skin is warm and dry.   Neurological:      General: No focal deficit present.      Mental Status: She is alert.   Psychiatric:         Mood and Affect: Mood normal.         Labs Reviewed:     Chemistry:  Lab Results   Component Value Date     04/17/2025    K 3.9 04/17/2025    BUN 9.2 (L) 04/17/2025    CREATININE 0.87 04/17/2025    EGFRNORACEVR >60 04/17/2025    CALCIUM 9.1 04/17/2025    ALKPHOS 67 04/17/2025    ALBUMIN 4.1 04/17/2025    BILIDIR 0.2 04/19/2022    IBILI 0.30 04/19/2022    AST 21 04/17/2025    ALT 14 04/17/2025    QYISHGGL08VA 33.5 12/09/2020    TSH 1.181 04/17/2025        Lab Results   Component Value Date    HGBA1C 5.6 09/24/2024        Hematology:  Lab Results   Component Value Date    WBC 4.83 04/17/2025    HGB 13.5 04/17/2025    HCT 39.3 04/17/2025     04/17/2025       Lipid Panel:  Lab Results   Component Value Date    CHOL 209 (H) 09/24/2024    HDL 76 (H) 09/24/2024    .00 09/24/2024    TRIG 73 09/24/2024    TOTALCHOLEST 3 09/24/2024        Urine:  No results found for: "COLORUA", "APPEARANCEUA", "SGUA", "PHUA", "PROTEINUA", "GLUCOSEUA", "KETONESUA", "BLOODUA", "NITRITESUA", "LEUKOCYTESUR", "RBCUA", "WBCUA", "BACTERIA", "SQEPUA", "HYALINECASTS", "CREATRANDUR", "PROTEINURINE", "UPROTCREA"     Assessment:       ICD-10-CM ICD-9-CM   1. Primary hypertension  I10 401.9   2. Overactive bladder  N32.81 596.51   3. Primary osteoarthritis of right knee  M17.11 715.16   4. Screening for colorectal cancer  Z12.11 V76.51    Z12.12 V76.41        Plan:     1. Primary hypertension  Overview:  Olmesartan 20 mg daily    Assessment & Plan:  Blood pressure at goal.  Continue above medication  Patient had peripheral edema with amlodipine    AHA/ACC goal <130/80. JNC8 goal <140/90  Low Sodium Diet (DASH Diet - Less than 2 grams of sodium per day).  Monitor blood pressure daily and log. Report " consistent numbers greater than 140/90.  Maintain healthy weight with goal BMI <30. Exercise 30 minutes per day, 5 days per week.        2. Overactive bladder  Assessment & Plan:  Patient with urinary urgency and incontinence.  No stress incontinence symptoms.  She has already tried scheduled voids with minimal improvement of symptoms.  Symptoms have improved with oxybutynin.  ENT put her on pilocarpine for dry mouth related to her radiation therapy, but she has not started yet.  Patient was advised to try.  She will keep a diary of food/drinks that might be worsening her symptoms.  She wants to consider increasing oxybutynin in the future.      3. Primary osteoarthritis of right knee  Overview:   Patient with a history of right knee meniscal tear and arthritis.  X-ray on 12/30/24 showed mild-to-moderate tricompartmental arthritis.    Therapies tried:    Voltaren gel - no improvement  Steroid injection - 1 month of improvement    Assessment & Plan:  She had a steroid injection of the knee last visit which provided her relief for 1 month    Patient is interested in trying lidocaine patches because her friend uses them for knee pain.  We will send him to the pharmacy.  We will also provide her meloxicam to take as needed.  Patient has normal kidney function and no history of PUD or GI bleed.    Orders:  -     LIDOcaine (LIDODERM) 5 %; Place 1 patch onto the skin once daily. Remove & Discard patch within 12 hours or as directed by MD  Dispense: 14 patch; Refill: 2  -     meloxicam (MOBIC) 7.5 MG tablet; Take 1 tablet (7.5 mg total) by mouth once daily.  Dispense: 30 tablet; Refill: 2    4. Screening for colorectal cancer  -     Ambulatory referral/consult to Gastroenterology         Follow up in about 3 months (around 8/13/2025). In addition to their scheduled follow up, the patient has also been instructed to follow up on as needed basis.     Future Appointments   Date Time Provider Department Center   8/13/2025  2:45  PM RESIDENT 2, [facility] OTORHINOLARYNGOLOGY [facility] ENT Tommy Colunga   8/14/2025  3:20 PM Iraida Barreto MD [facility]   5/12/2026  2:00 PM RESIDENT 2, [facility] OTORHINOLARYNGOLOGY [facility] ENT Tommy Barreto MD

## 2025-05-13 NOTE — ASSESSMENT & PLAN NOTE
She had a steroid injection of the knee last visit which provided her relief for 1 month    Patient is interested in trying lidocaine patches because her friend uses them for knee pain.  We will send him to the pharmacy.  We will also provide her meloxicam to take as needed.  Patient has normal kidney function and no history of PUD or GI bleed.

## 2025-05-13 NOTE — ASSESSMENT & PLAN NOTE
Patient with urinary urgency and incontinence.  No stress incontinence symptoms.  She has already tried scheduled voids with minimal improvement of symptoms.  Symptoms have improved with oxybutynin.  ENT put her on pilocarpine for dry mouth related to her radiation therapy, but she has not started yet.  Patient was advised to try.  She will keep a diary of food/drinks that might be worsening her symptoms.  She wants to consider increasing oxybutynin in the future.

## 2025-05-14 DIAGNOSIS — M17.11 PRIMARY OSTEOARTHRITIS OF RIGHT KNEE: ICD-10-CM

## 2025-05-14 RX ORDER — MELOXICAM 7.5 MG/1
7.5 TABLET ORAL DAILY
Qty: 30 TABLET | Refills: 2 | Status: SHIPPED | OUTPATIENT
Start: 2025-05-14

## 2025-05-27 ENCOUNTER — OFFICE VISIT (OUTPATIENT)
Dept: FAMILY MEDICINE | Facility: CLINIC | Age: 60
End: 2025-05-27
Payer: COMMERCIAL

## 2025-05-27 VITALS
WEIGHT: 209.19 LBS | OXYGEN SATURATION: 97 % | DIASTOLIC BLOOD PRESSURE: 81 MMHG | SYSTOLIC BLOOD PRESSURE: 121 MMHG | BODY MASS INDEX: 33.77 KG/M2 | RESPIRATION RATE: 15 BRPM | TEMPERATURE: 98 F | HEART RATE: 76 BPM

## 2025-05-27 DIAGNOSIS — J06.9 VIRAL URI: Primary | ICD-10-CM

## 2025-05-27 PROCEDURE — 99213 OFFICE O/P EST LOW 20 MIN: CPT | Mod: ,,,

## 2025-05-27 PROCEDURE — 3079F DIAST BP 80-89 MM HG: CPT | Mod: CPTII,,,

## 2025-05-27 PROCEDURE — 1160F RVW MEDS BY RX/DR IN RCRD: CPT | Mod: CPTII,,,

## 2025-05-27 PROCEDURE — 3008F BODY MASS INDEX DOCD: CPT | Mod: CPTII,,,

## 2025-05-27 PROCEDURE — 1159F MED LIST DOCD IN RCRD: CPT | Mod: CPTII,,,

## 2025-05-27 PROCEDURE — 4010F ACE/ARB THERAPY RXD/TAKEN: CPT | Mod: CPTII,,,

## 2025-05-27 PROCEDURE — 3074F SYST BP LT 130 MM HG: CPT | Mod: CPTII,,,

## 2025-05-27 RX ORDER — CHLOPHEDIANOL HCL AND PYRILAMINE MALEATE 12.5; 12.5 MG/5ML; MG/5ML
10 SOLUTION ORAL EVERY 8 HOURS PRN
Qty: 200 ML | Refills: 0 | Status: SHIPPED | OUTPATIENT
Start: 2025-05-27

## 2025-05-27 RX ORDER — OXYBUTYNIN CHLORIDE 10 MG/1
10 TABLET, EXTENDED RELEASE ORAL DAILY
Qty: 30 TABLET | Refills: 11 | Status: SHIPPED | OUTPATIENT
Start: 2025-05-27 | End: 2026-05-27

## 2025-05-27 NOTE — PATIENT INSTRUCTIONS
Take Tylenol or Motrin as needed for fevers and body aches. Take DayQuil, Penny [place] Cough and Could, or other over the counter cold medications for your symptoms. Use Mucinex as needed for nasal congestion. For sore throat, try over the counter lozenges or throat sprays, gargle with warm salt and water, or drink warm tea with honey. Get plenty of rest, eat a healthy diet, avoid alcohol and smoking. Cover your mouth with coughing, avoid sharing cups or lip balm, and wash your hand before eating or touching your face.Stay home from work or school until your symptoms resolve. Return to clinic or urgent care for worsening symptoms.

## 2025-05-27 NOTE — PROGRESS NOTES
[facility]  Iraida Barreto MD    Patient ID: [id number]     Chief Complaint: Sore Throat, Cough, Sinusitis, Chills, Generalized Body Aches, and Headache      HPI:     Jil Duque is a 59 y.o. female here today for cough.    Patient reports congestion and productive cough for about 4 days. No fevers, but she reports chills and body aches. Son had similar symptoms.     Past Medical History:   Diagnosis Date    HTN (hypertension)     Neuropathy     Obesity, unspecified     Overactive bladder     Primary hypertension 10/25/2024    Radiation Oct 2019    Snoring     Tonsil cancer 07/2019        Past Surgical History:   Procedure Laterality Date    COLONOSCOPY  01/22/2018    Son KENNA MD [place]  Repeat 4 years    EYE SURGERY  2001    HYSTERECTOMY      left foream      right heel          Social History     Tobacco Use    Smoking status: Never    Smokeless tobacco: Never   Substance and Sexual Activity    Alcohol use: Never    Drug use: Never    Sexual activity: Not Currently     Birth control/protection: Abstinence        Current Outpatient Medications   Medication Instructions    clotrimazole-betamethasone 1-0.05% (LOTRISONE) cream     fluocinonide (LIDEX) 0.05 % external solution 1 application , Topical (Top), Every other day    fluticasone propionate (FLONASE) 50 mcg, Each Nostril, Daily    gabapentin (NEURONTIN) 300 MG capsule Take 2 capsule daily at bedtime.    ketoconazole (NIZORAL) 2 % shampoo Topical (Top), Twice weekly    LIDOcaine (LIDODERM) 5 % 1 patch, Transdermal, Daily, Remove & Discard patch within 12 hours or as directed by MD    meloxicam (MOBIC) 7.5 mg, Oral, Daily    olmesartan (BENICAR) 20 mg, Oral, Daily    oxybutynin (DITROPAN-XL) 10 mg, Oral, Daily    pilocarpine (SALAGEN) 5 mg, Oral, 3 times daily    pyrilamine-chlophedianoL (NINJACOF) 12.5-12.5 mg/5 mL Liqd 10 mLs, Oral, Every 8 hours PRN    traMADoL (ULTRAM) 50 mg, Every 4 hours PRN       Review of patient's allergies  indicates:  No Known Allergies     Patient Care Team:  Iraida Barreto MD as PCP - General (Family Medicine)  Jack Real MD as Consulting Physician (Otolaryngology)  Desire Shayne MD CRISTINA as Consulting Physician (Gastroenterology)  Dany, Juan Antonio Alvarado MD (Orthopedic Surgery)  Jodie Gil LPN as Care Coordinator     Subjective:     Review of Systems    12 point review of systems conducted, negative except as stated in the history of present illness. See HPI for details.    Objective:     Visit Vitals  /81 (Patient Position: Sitting)   Pulse 76   Temp 97.5 °F (36.4 °C) (Oral)   Resp 15   Wt 94.9 kg (209 lb 3.2 oz)   SpO2 97%   BMI 33.77 kg/m²       Physical Exam  Vitals and nursing note reviewed.   Constitutional:       General: She is not in acute distress.     Appearance: She is not ill-appearing.   HENT:      Head: Normocephalic and atraumatic.      Nose: Congestion present.   Eyes:      General: No scleral icterus.     Extraocular Movements: Extraocular movements intact.      Conjunctiva/sclera: Conjunctivae normal.   Cardiovascular:      Rate and Rhythm: Normal rate and regular rhythm.      Heart sounds: No murmur heard.     No friction rub. No gallop.   Pulmonary:      Effort: Pulmonary effort is normal. No respiratory distress.      Breath sounds: Normal breath sounds. No wheezing, rhonchi or rales.   Musculoskeletal:         General: Normal range of motion.      Right lower leg: No edema.      Left lower leg: No edema.   Skin:     General: Skin is warm and dry.   Neurological:      General: No focal deficit present.      Mental Status: She is alert.   Psychiatric:         Mood and Affect: Mood normal.         Labs Reviewed:     Chemistry:  Lab Results   Component Value Date     04/17/2025    K 3.9 04/17/2025    BUN 9.2 (L) 04/17/2025    CREATININE 0.87 04/17/2025    EGFRNORACEVR >60 04/17/2025    CALCIUM 9.1 04/17/2025    ALKPHOS 67 04/17/2025    ALBUMIN 4.1 04/17/2025    BILIDIR 0.2  "04/19/2022    IBILI 0.30 04/19/2022    AST 21 04/17/2025    ALT 14 04/17/2025    [id number] 33.5 12/09/2020    TSH 1.181 04/17/2025        Lab Results   Component Value Date    HGBA1C 5.6 09/24/2024        Hematology:  Lab Results   Component Value Date    WBC 4.83 04/17/2025    HGB 13.5 04/17/2025    HCT 39.3 04/17/2025     04/17/2025       Lipid Panel:  Lab Results   Component Value Date    CHOL 209 (H) 09/24/2024    HDL 76 (H) 09/24/2024    .00 09/24/2024    TRIG 73 09/24/2024    TOTALCHOLEST 3 09/24/2024        Urine:  No results found for: "COLORUA", "APPEARANCEUA", "SGUA", "PHUA", "PROTEINUA", "GLUCOSEUA", "KETONESUA", "BLOODUA", "NITRITESUA", "LEUKOCYTESUR", "RBCUA", "WBCUA", "BACTERIA", "SQEPUA", "HYALINECASTS", "CREATRANDUR", "PROTEINURINE", "UPROTCREA"     Assessment:       ICD-10-CM ICD-9-CM   1. Viral URI  J06.9 465.9        Plan:     1. Viral URI  -     pyrilamine-chlophedianoL (NINJACOF) 12.5-12.5 mg/5 mL Liqd; Take 10 mLs by mouth every 8 (eight) hours as needed (cough/congestion).  Dispense: 200 mL; Refill: 0    Other orders  -     oxybutynin (DITROPAN-XL) 10 MG 24 hr tablet; Take 1 tablet (10 mg total) by mouth once daily.  Dispense: 30 tablet; Refill: 11      Take Tylenol or Motrin as needed for fevers and body aches. Take DayQuil, Penny [place] Cough and Cold, or other over the counter cold medications for your symptoms. Use Mucinex as needed for nasal congestion. For sore throat, try over the counter lozenges or throat sprays, gargle with warm salt and water, or drink warm tea with honey. Get plenty of rest, eat a healthy diet, avoid alcohol and smoking. Cover your mouth with coughing, avoid sharing cups or lip balm, and wash your hand before eating or touching your face.Stay home from work or school until your symptoms resolve. Return to clinic or urgent care for worsening symptoms.        No follow-ups on file. In addition to their scheduled follow up, the patient has also been " instructed to follow up on as needed basis.     Future Appointments   Date Time Provider Department Center   8/13/2025  2:45 PM RESIDENT 2, [facility] OTORHINOLARYNGOLOGY [facility] ENT Tommy Colunga   8/14/2025  3:20 PM Iraida Barreto MD [facility]   5/12/2026  2:00 PM RESIDENT 2, [facility] OTORHINOLARYNGOLOGY [facility] ENT Tommy Barreto MD

## 2025-08-13 ENCOUNTER — CLINICAL SUPPORT (OUTPATIENT)
Dept: OTOLARYNGOLOGY | Facility: CLINIC | Age: 60
End: 2025-08-13
Payer: COMMERCIAL

## 2025-08-13 DIAGNOSIS — C09.9 SQUAMOUS CELL CARCINOMA OF RIGHT TONSIL: Primary | ICD-10-CM
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
## 2025-08-13 DIAGNOSIS — K11.7 XEROSTOMIA DUE TO RADIOTHERAPY: ICD-10-CM

## 2025-08-14 ENCOUNTER — OFFICE VISIT (OUTPATIENT)
Dept: FAMILY MEDICINE | Facility: CLINIC | Age: 60
End: 2025-08-14
Payer: COMMERCIAL

## 2025-08-14 VITALS
DIASTOLIC BLOOD PRESSURE: 82 MMHG | WEIGHT: 208 LBS | RESPIRATION RATE: 15 BRPM | SYSTOLIC BLOOD PRESSURE: 138 MMHG | TEMPERATURE: 98 F | BODY MASS INDEX: 33.57 KG/M2 | OXYGEN SATURATION: 98 % | HEART RATE: 57 BPM

## 2025-08-14 DIAGNOSIS — L21.9 SEBORRHEIC DERMATITIS: ICD-10-CM

## 2025-08-14 DIAGNOSIS — Z00.00 WELL ADULT EXAM: ICD-10-CM

## 2025-08-14 DIAGNOSIS — I10 PRIMARY HYPERTENSION: Primary | ICD-10-CM

## 2025-08-14 PROCEDURE — 1159F MED LIST DOCD IN RCRD: CPT | Mod: CPTII,,,

## 2025-08-14 PROCEDURE — 3008F BODY MASS INDEX DOCD: CPT | Mod: CPTII,,,

## 2025-08-14 PROCEDURE — 1160F RVW MEDS BY RX/DR IN RCRD: CPT | Mod: CPTII,,,

## 2025-08-14 PROCEDURE — G2211 COMPLEX E/M VISIT ADD ON: HCPCS | Mod: ,,,

## 2025-08-14 PROCEDURE — 4010F ACE/ARB THERAPY RXD/TAKEN: CPT | Mod: CPTII,,,

## 2025-08-14 PROCEDURE — 3079F DIAST BP 80-89 MM HG: CPT | Mod: CPTII,,,

## 2025-08-14 PROCEDURE — 99213 OFFICE O/P EST LOW 20 MIN: CPT | Mod: ,,,

## 2025-08-14 PROCEDURE — 3075F SYST BP GE 130 - 139MM HG: CPT | Mod: CPTII,,,

## 2025-08-14 RX ORDER — FLUOCINONIDE TOPICAL SOLUTION USP, 0.05% 0.5 MG/ML
1 SOLUTION TOPICAL EVERY OTHER DAY
Qty: 60 ML | Refills: 2 | Status: SHIPPED | OUTPATIENT
Start: 2025-08-14

## 2025-08-14 RX ORDER — KETOCONAZOLE 20 MG/ML
SHAMPOO, SUSPENSION TOPICAL
Qty: 120 ML | Refills: 3 | Status: SHIPPED | OUTPATIENT
Start: 2025-08-14